# Patient Record
Sex: FEMALE | Race: WHITE | NOT HISPANIC OR LATINO | Employment: OTHER | URBAN - NONMETROPOLITAN AREA
[De-identification: names, ages, dates, MRNs, and addresses within clinical notes are randomized per-mention and may not be internally consistent; named-entity substitution may affect disease eponyms.]

---

## 2018-02-28 RX ORDER — LEVOTHYROXINE SODIUM 0.1 MG/1
100 TABLET ORAL DAILY
COMMUNITY

## 2018-02-28 RX ORDER — PANTOPRAZOLE SODIUM 40 MG/1
40 TABLET, DELAYED RELEASE ORAL 2 TIMES DAILY
COMMUNITY

## 2018-02-28 RX ORDER — CALCIUM CARBONATE 200(500)MG
1 TABLET,CHEWABLE ORAL AS NEEDED
COMMUNITY
End: 2018-04-12 | Stop reason: DRUGHIGH

## 2018-02-28 RX ORDER — SIMVASTATIN 40 MG
40 TABLET ORAL NIGHTLY
COMMUNITY

## 2018-02-28 RX ORDER — SUCRALFATE 1 G/1
1 TABLET ORAL 4 TIMES DAILY
COMMUNITY
End: 2019-12-03

## 2018-02-28 RX ORDER — NAPROXEN 250 MG/1
250 TABLET ORAL AS NEEDED
COMMUNITY

## 2018-02-28 RX ORDER — MELATONIN
1000 DAILY
COMMUNITY

## 2018-04-12 ENCOUNTER — OFFICE VISIT (OUTPATIENT)
Dept: GASTROENTEROLOGY | Facility: CLINIC | Age: 68
End: 2018-04-12

## 2018-04-12 VITALS
DIASTOLIC BLOOD PRESSURE: 82 MMHG | TEMPERATURE: 98.4 F | OXYGEN SATURATION: 98 % | HEIGHT: 63 IN | WEIGHT: 182 LBS | BODY MASS INDEX: 32.25 KG/M2 | HEART RATE: 82 BPM | SYSTOLIC BLOOD PRESSURE: 136 MMHG

## 2018-04-12 DIAGNOSIS — K21.9 GASTROESOPHAGEAL REFLUX DISEASE, ESOPHAGITIS PRESENCE NOT SPECIFIED: Primary | ICD-10-CM

## 2018-04-12 PROCEDURE — 99204 OFFICE O/P NEW MOD 45 MIN: CPT | Performed by: NURSE PRACTITIONER

## 2018-04-12 NOTE — PROGRESS NOTES
[facility] GASTROENTEROLOGY - OFFICE NOTE    4/12/2018    Warren Quinn   1950    Primary Physician: Claudia Keane DO    Chief Complaint   Patient presents with   • Heartburn         HISTORY OF PRESENT ILLNESS    Warren Quinn is a 67 y.o. female presents  with reflux symptoms x  2 yrs. Mostly at night.  Symptoms are heartburn and regurgitation. Certain foods such as mexican can trigger. No caffeine. She is trying to lose weight.  Has been on pantoprazole for 2 years and reflux has never been under control. Recently increased pantoprazole to bid, added carafate and has been doing much better. She has never had egd.     Past Medical History:   Diagnosis Date   • Arthritis    • Breast cancer    • Disease of thyroid gland    • GERD (gastroesophageal reflux disease)    • Hyperlipidemia    • Osteopenia        Past Surgical History:   Procedure Laterality Date   • ANKLE SURGERY     • BREAST AUGMENTATION     • BREAST LUMPECTOMY     • BREAST RECONSTRUCTION     • CARPAL TUNNEL RELEASE     • CATARACT EXTRACTION     • COLONOSCOPY  12/05/2014   • TONSILLECTOMY     • TOTAL HIP ARTHROPLASTY     • TUBAL ABDOMINAL LIGATION         Outpatient Prescriptions Marked as Taking for the 4/12/18 encounter (Office Visit) with JORDAN Nunes   Medication Sig Dispense Refill   • cholecalciferol (VITAMIN D3) 1000 units tablet Take 1,000 Units by mouth Daily.     • levothyroxine (SYNTHROID, LEVOTHROID) 100 MCG tablet Take 100 mcg by mouth Daily.     • naproxen (NAPROSYN) 250 MG tablet Take 250 mg by mouth As Needed.     • pantoprazole (PROTONIX) 40 MG EC tablet Take 40 mg by mouth 2 (Two) Times a Day.     • simvastatin (ZOCOR) 40 MG tablet Take 40 mg by mouth Every Night.     • sucralfate (CARAFATE) 1 g tablet Take 1 g by mouth 4 (Four) Times a Day.         No Known Allergies    Social History     Social History   • Marital status: Single     Spouse name: N/A   • Number of children: N/A   • Years of education: N/A  "    Occupational History   • Not on file.     Social History Main Topics   • Smoking status: Former Smoker   • Smokeless tobacco: Never Used   • Alcohol use Yes      Comment: rare   • Drug use: No   • Sexual activity: Defer     Other Topics Concern   • Not on file     Social History Narrative   • No narrative on file       Family History   Problem Relation Age of Onset   • Colon polyps Father    • Colon cancer Neg Hx        Review of Systems   Constitutional: Negative for chills and fever.   Respiratory: Negative for cough, shortness of breath and wheezing.    Cardiovascular: Negative for chest pain and palpitations.   Gastrointestinal: Negative for abdominal distention, abdominal pain, anal bleeding, blood in stool, constipation, diarrhea, nausea, rectal pain and vomiting.        Vitals:    04/12/18 0944   BP: 136/82   BP Location: Right arm   Patient Position: Sitting   Cuff Size: Adult   Pulse: 82   Temp: 98.4 °F (36.9 °C)   SpO2: 98%   Weight: 82.6 kg (182 lb)   Height: 160 cm (63\")      Body mass index is 32.24 kg/m².    Physical Exam   Constitutional: She is oriented to person, place, and time. She appears well-developed and well-nourished. No distress.   Cardiovascular: Normal rate, regular rhythm and normal heart sounds.    Pulmonary/Chest: Effort normal and breath sounds normal.   Abdominal: Soft. Bowel sounds are normal. She exhibits no distension and no mass. There is no tenderness. There is no rebound and no guarding.   Musculoskeletal: She exhibits no edema.   Neurological: She is alert and oriented to person, place, and time.   Skin: Skin is warm and dry.   Psychiatric: She has a normal mood and affect. Her behavior is normal.   Vitals reviewed.      No results found for this or any previous visit.        ASSESSMENT AND PLAN    Warren was seen today for heartburn.    Diagnoses and all orders for this visit:    Gastroesophageal reflux disease, esophagitis presence not specified  -     Case Request; " Standing  -     Case Request    Other orders  -     Implement Anesthesia Orders Day of Procedure; Standing  -     Obtain Informed Consent; Standing      Recommend  strict antireflux precautions. Continue pantoprazole daily.  rec weight loss.  Plan for egd for longstanding reflux. We discussed changes in the esophagus that can occur such as monzon's esophagus and it's slim chance of developing cancer.     ESOPHAGOGASTRODUODENOSCOPY WITH ANESTHESIA (N/A)   Risk, benefits, and alternatives of endoscopy were explained in full.  They understand that there is a risk of bleeding, perforation, and infection.  The risk of perforation goes up with esophageal dilation.  Other options to evaluate UGI complaints could involve barium swallow or UGI series, but these would be diagnostic tests only.  Patient was given time to ask questions.  I answered them to their satisfaction and they are agreeable to proceeding    Body mass index is 32.24 kg/m².       Patient's Body mass index is 32.24 kg/m². BMI is above normal parameters. Follow-up plan includes:  no follow-up required.          JORDAN Santa Dragon/transcription disclaimer:  Much of this encounter note is electronic transcription/translation of spoken language to printed text.  The electronic translation of spoken language may be erroneous, or at times, nonsensical words or phrases may be inadvertently transcribed.  Although I have reviewed the note for such errors, some may still exist.

## 2018-04-13 PROBLEM — K21.9 GASTROESOPHAGEAL REFLUX DISEASE: Status: ACTIVE | Noted: 2018-04-13

## 2018-05-01 ENCOUNTER — HOSPITAL ENCOUNTER (OUTPATIENT)
Facility: HOSPITAL | Age: 68
Setting detail: HOSPITAL OUTPATIENT SURGERY
Discharge: HOME | End: 2018-05-01
Attending: INTERNAL MEDICINE | Admitting: INTERNAL MEDICINE

## 2018-05-01 ENCOUNTER — ANESTHESIA (OUTPATIENT)
Dept: GASTROENTEROLOGY | Facility: HOSPITAL | Age: 68
End: 2018-05-01

## 2018-05-01 ENCOUNTER — ANESTHESIA EVENT (OUTPATIENT)
Dept: GASTROENTEROLOGY | Facility: HOSPITAL | Age: 68
End: 2018-05-01

## 2018-05-01 VITALS
RESPIRATION RATE: 17 BRPM | WEIGHT: 176 LBS | HEIGHT: 63 IN | DIASTOLIC BLOOD PRESSURE: 71 MMHG | OXYGEN SATURATION: 99 % | BODY MASS INDEX: 31.18 KG/M2 | TEMPERATURE: 97.5 F | SYSTOLIC BLOOD PRESSURE: 129 MMHG | HEART RATE: 68 BPM

## 2018-05-01 DIAGNOSIS — K21.9 GASTROESOPHAGEAL REFLUX DISEASE, ESOPHAGITIS PRESENCE NOT SPECIFIED: ICD-10-CM

## 2018-05-01 PROCEDURE — 43239 EGD BIOPSY SINGLE/MULTIPLE: CPT | Performed by: INTERNAL MEDICINE

## 2018-05-01 PROCEDURE — 25010000002 PROPOFOL 10 MG/ML EMULSION: Performed by: NURSE ANESTHETIST, CERTIFIED REGISTERED

## 2018-05-01 PROCEDURE — 88305 TISSUE EXAM BY PATHOLOGIST: CPT | Performed by: INTERNAL MEDICINE

## 2018-05-01 RX ORDER — LIDOCAINE HYDROCHLORIDE 20 MG/ML
INJECTION, SOLUTION INFILTRATION; PERINEURAL AS NEEDED
Status: DISCONTINUED | OUTPATIENT
Start: 2018-05-01 | End: 2018-05-01 | Stop reason: SURG

## 2018-05-01 RX ORDER — ONDANSETRON 2 MG/ML
4 INJECTION INTRAMUSCULAR; INTRAVENOUS ONCE AS NEEDED
Status: DISCONTINUED | OUTPATIENT
Start: 2018-05-01 | End: 2018-05-01 | Stop reason: HOSPADM

## 2018-05-01 RX ORDER — PROPOFOL 10 MG/ML
VIAL (ML) INTRAVENOUS AS NEEDED
Status: DISCONTINUED | OUTPATIENT
Start: 2018-05-01 | End: 2018-05-01 | Stop reason: SURG

## 2018-05-01 RX ORDER — SODIUM CHLORIDE 0.9 % (FLUSH) 0.9 %
3 SYRINGE (ML) INJECTION AS NEEDED
Status: DISCONTINUED | OUTPATIENT
Start: 2018-05-01 | End: 2018-05-01 | Stop reason: HOSPADM

## 2018-05-01 RX ORDER — SODIUM CHLORIDE 9 MG/ML
500 INJECTION, SOLUTION INTRAVENOUS CONTINUOUS PRN
Status: DISCONTINUED | OUTPATIENT
Start: 2018-05-01 | End: 2018-05-01 | Stop reason: HOSPADM

## 2018-05-01 RX ADMIN — SODIUM CHLORIDE 500 ML: 9 INJECTION, SOLUTION INTRAVENOUS at 07:35

## 2018-05-01 RX ADMIN — PROPOFOL 100 MG: 10 INJECTION, EMULSION INTRAVENOUS at 09:25

## 2018-05-01 RX ADMIN — LIDOCAINE HYDROCHLORIDE 120 MG: 20 INJECTION, SOLUTION INFILTRATION; PERINEURAL at 09:25

## 2018-05-01 NOTE — ANESTHESIA POSTPROCEDURE EVALUATION
"Patient: Warren Quinn    Procedure Summary     Date:  05/01/18 Room / Location:  [facility] ENDOSCOPY 6 /  [facility]    Anesthesia Start:  0922 Anesthesia Stop:  0930    Procedure:  ESOPHAGOGASTRODUODENOSCOPY WITH ANESTHESIA (N/A Esophagus) Diagnosis:       Gastroesophageal reflux disease, esophagitis presence not specified      (Gastroesophageal reflux disease, esophagitis presence not specified [K21.9])    Surgeon:  Elmer Crawley MD Provider:  Vlad Roberts CRNA    Anesthesia Type:  general ASA Status:  2          Anesthesia Type: general  Last vitals  BP   127/79 (05/01/18 0719)   Temp   97.5 °F (36.4 °C) (05/01/18 0719)   Pulse   86 (05/01/18 0719)   Resp   20 (05/01/18 0719)     SpO2   94 % (05/01/18 0719)     Post Anesthesia Care and Evaluation    Patient location during evaluation: PACU  Patient participation: complete - patient participated  Level of consciousness: awake and alert  Pain management: adequate  Airway patency: patent  Anesthetic complications: No anesthetic complications    Cardiovascular status: acceptable  Respiratory status: acceptable  Hydration status: acceptable    Comments: Blood pressure 127/79, pulse 86, temperature 97.5 °F (36.4 °C), temperature source Temporal Artery , resp. rate 20, height 160 cm (63\"), weight 79.8 kg (176 lb), SpO2 94 %.    Pt discharged from PACU based on oksana score >8      "

## 2018-05-01 NOTE — ANESTHESIA PREPROCEDURE EVALUATION
Anesthesia Evaluation     Patient summary reviewed   no history of anesthetic complications:  NPO Solid Status: > 8 hours  NPO Liquid Status: > 8 hours           Airway   Mallampati: II  TM distance: <3 FB  Neck ROM: full  No difficulty expected  Dental - normal exam     Comment: Multiple bridges, caps and crowns    Pulmonary    (-) asthma, sleep apnea, not a smoker  Cardiovascular   Exercise tolerance: good (4-7 METS)    (+) hyperlipidemia,       Neuro/Psych  (-) seizures, TIA, CVA  GI/Hepatic/Renal/Endo    (+)  GERD,  hypothyroidism,   (-) liver disease, no renal disease, diabetes    Musculoskeletal     Abdominal    Substance History      OB/GYN          Other      history of cancer (breast )                  Anesthesia Plan    ASA 2     general   total IV anesthesia  intravenous induction   Anesthetic plan and risks discussed with patient.

## 2018-05-01 NOTE — H&P (VIEW-ONLY)
[facility] GASTROENTEROLOGY - OFFICE NOTE    4/12/2018    Warren Quinn   1950    Primary Physician: Claudia Keane DO    Chief Complaint   Patient presents with   • Heartburn         HISTORY OF PRESENT ILLNESS    Warren Quinn is a 67 y.o. female presents  with reflux symptoms x  2 yrs. Mostly at night.  Symptoms are heartburn and regurgitation. Certain foods such as mexican can trigger. No caffeine. She is trying to lose weight.  Has been on pantoprazole for 2 years and reflux has never been under control. Recently increased pantoprazole to bid, added carafate and has been doing much better. She has never had egd.     Past Medical History:   Diagnosis Date   • Arthritis    • Breast cancer    • Disease of thyroid gland    • GERD (gastroesophageal reflux disease)    • Hyperlipidemia    • Osteopenia        Past Surgical History:   Procedure Laterality Date   • ANKLE SURGERY     • BREAST AUGMENTATION     • BREAST LUMPECTOMY     • BREAST RECONSTRUCTION     • CARPAL TUNNEL RELEASE     • CATARACT EXTRACTION     • COLONOSCOPY  12/05/2014   • TONSILLECTOMY     • TOTAL HIP ARTHROPLASTY     • TUBAL ABDOMINAL LIGATION         Outpatient Prescriptions Marked as Taking for the 4/12/18 encounter (Office Visit) with JORDAN Nunes   Medication Sig Dispense Refill   • cholecalciferol (VITAMIN D3) 1000 units tablet Take 1,000 Units by mouth Daily.     • levothyroxine (SYNTHROID, LEVOTHROID) 100 MCG tablet Take 100 mcg by mouth Daily.     • naproxen (NAPROSYN) 250 MG tablet Take 250 mg by mouth As Needed.     • pantoprazole (PROTONIX) 40 MG EC tablet Take 40 mg by mouth 2 (Two) Times a Day.     • simvastatin (ZOCOR) 40 MG tablet Take 40 mg by mouth Every Night.     • sucralfate (CARAFATE) 1 g tablet Take 1 g by mouth 4 (Four) Times a Day.         No Known Allergies    Social History     Social History   • Marital status: Single     Spouse name: N/A   • Number of children: N/A   • Years of education: N/A  "    Occupational History   • Not on file.     Social History Main Topics   • Smoking status: Former Smoker   • Smokeless tobacco: Never Used   • Alcohol use Yes      Comment: rare   • Drug use: No   • Sexual activity: Defer     Other Topics Concern   • Not on file     Social History Narrative   • No narrative on file       Family History   Problem Relation Age of Onset   • Colon polyps Father    • Colon cancer Neg Hx        Review of Systems   Constitutional: Negative for chills and fever.   Respiratory: Negative for cough, shortness of breath and wheezing.    Cardiovascular: Negative for chest pain and palpitations.   Gastrointestinal: Negative for abdominal distention, abdominal pain, anal bleeding, blood in stool, constipation, diarrhea, nausea, rectal pain and vomiting.        Vitals:    04/12/18 0944   BP: 136/82   BP Location: Right arm   Patient Position: Sitting   Cuff Size: Adult   Pulse: 82   Temp: 98.4 °F (36.9 °C)   SpO2: 98%   Weight: 82.6 kg (182 lb)   Height: 160 cm (63\")      Body mass index is 32.24 kg/m².    Physical Exam   Constitutional: She is oriented to person, place, and time. She appears well-developed and well-nourished. No distress.   Cardiovascular: Normal rate, regular rhythm and normal heart sounds.    Pulmonary/Chest: Effort normal and breath sounds normal.   Abdominal: Soft. Bowel sounds are normal. She exhibits no distension and no mass. There is no tenderness. There is no rebound and no guarding.   Musculoskeletal: She exhibits no edema.   Neurological: She is alert and oriented to person, place, and time.   Skin: Skin is warm and dry.   Psychiatric: She has a normal mood and affect. Her behavior is normal.   Vitals reviewed.      No results found for this or any previous visit.        ASSESSMENT AND PLAN    Warren was seen today for heartburn.    Diagnoses and all orders for this visit:    Gastroesophageal reflux disease, esophagitis presence not specified  -     Case Request; " Standing  -     Case Request    Other orders  -     Implement Anesthesia Orders Day of Procedure; Standing  -     Obtain Informed Consent; Standing      Recommend  strict antireflux precautions. Continue pantoprazole daily.  rec weight loss.  Plan for egd for longstanding reflux. We discussed changes in the esophagus that can occur such as monzon's esophagus and it's slim chance of developing cancer.     ESOPHAGOGASTRODUODENOSCOPY WITH ANESTHESIA (N/A)   Risk, benefits, and alternatives of endoscopy were explained in full.  They understand that there is a risk of bleeding, perforation, and infection.  The risk of perforation goes up with esophageal dilation.  Other options to evaluate UGI complaints could involve barium swallow or UGI series, but these would be diagnostic tests only.  Patient was given time to ask questions.  I answered them to their satisfaction and they are agreeable to proceeding    Body mass index is 32.24 kg/m².       Patient's Body mass index is 32.24 kg/m². BMI is above normal parameters. Follow-up plan includes:  no follow-up required.          JORDAN Santa Dragon/transcription disclaimer:  Much of this encounter note is electronic transcription/translation of spoken language to printed text.  The electronic translation of spoken language may be erroneous, or at times, nonsensical words or phrases may be inadvertently transcribed.  Although I have reviewed the note for such errors, some may still exist.

## 2018-05-02 LAB
CYTO UR: NORMAL
LAB AP CASE REPORT: NORMAL
LAB AP CLINICAL INFORMATION: NORMAL
Lab: NORMAL
PATH REPORT.FINAL DX SPEC: NORMAL
PATH REPORT.GROSS SPEC: NORMAL

## 2019-12-03 ENCOUNTER — OFFICE VISIT (OUTPATIENT)
Dept: GASTROENTEROLOGY | Facility: CLINIC | Age: 69
End: 2019-12-03

## 2019-12-03 VITALS
HEART RATE: 82 BPM | TEMPERATURE: 96.1 F | SYSTOLIC BLOOD PRESSURE: 132 MMHG | OXYGEN SATURATION: 99 % | DIASTOLIC BLOOD PRESSURE: 78 MMHG | BODY MASS INDEX: 31.54 KG/M2 | HEIGHT: 63 IN | WEIGHT: 178 LBS

## 2019-12-03 DIAGNOSIS — Z83.71 FAMILY HX COLONIC POLYPS: Primary | ICD-10-CM

## 2019-12-03 PROBLEM — Z83.719 FAMILY HX COLONIC POLYPS: Status: ACTIVE | Noted: 2019-12-03

## 2019-12-03 PROCEDURE — S0260 H&P FOR SURGERY: HCPCS | Performed by: NURSE PRACTITIONER

## 2019-12-03 NOTE — PROGRESS NOTES
[facility] Gastroenterology    Primary Physician Claudia Keane,     12/3/2019    Warren Quinn   1950      Chief Complaint   Patient presents with   • Colonoscopy       Subjective     HPI    Warren Quinn is a 69 y.o. female who presents as a referral for preventative maintenance. She has no complaints of nausea or vomiting. No change in bowels. No wt loss. No BRBPR. No melena. No abdominal pain.       Last colonoscopy was 12/2014 noting mild sigmoid diverticulosis, recommended recall 5 years.  The patient does not have history of colon polyps. The patient does not have history of colon cancer.  There is  family history of colon polyps father. There is no family history of colon cancer.     Past Medical History:   Diagnosis Date   • Arthritis    • Breast cancer (CMS/HCC)    • Disease of thyroid gland    • GERD (gastroesophageal reflux disease)    • Hyperlipidemia    • Osteopenia        Past Surgical History:   Procedure Laterality Date   • ANKLE SURGERY     • BREAST AUGMENTATION     • BREAST LUMPECTOMY     • BREAST RECONSTRUCTION     • CARPAL TUNNEL RELEASE     • CATARACT EXTRACTION     • COLONOSCOPY  12/05/2014   • ENDOSCOPY N/A 5/1/2018    Procedure: ESOPHAGOGASTRODUODENOSCOPY WITH ANESTHESIA;  Surgeon: Elmer Crawley MD;  Location: [facility] ENDOSCOPY;  Service: Gastroenterology   • TONSILLECTOMY     • TOTAL HIP ARTHROPLASTY     • TUBAL ABDOMINAL LIGATION         Outpatient Medications Marked as Taking for the 12/3/19 encounter (Office Visit) with Susanne Adkins APRN   Medication Sig Dispense Refill   • cholecalciferol (VITAMIN D3) 1000 units tablet Take 1,000 Units by mouth Daily.     • levothyroxine (SYNTHROID, LEVOTHROID) 100 MCG tablet Take 100 mcg by mouth Daily.     • naproxen (NAPROSYN) 250 MG tablet Take 250 mg by mouth As Needed (rare).     • pantoprazole (PROTONIX) 40 MG EC tablet Take 40 mg by mouth 2 (Two) Times a Day.     • simvastatin (ZOCOR) 40 MG tablet Take 40 mg  by mouth Every Night.         No Known Allergies    Social History     Socioeconomic History   • Marital status: Single     Spouse name: Not on file   • Number of children: Not on file   • Years of education: Not on file   • Highest education level: Not on file   Tobacco Use   • Smoking status: Former Smoker   • Smokeless tobacco: Never Used   Substance and Sexual Activity   • Alcohol use: Yes     Comment: rare   • Drug use: No   • Sexual activity: Defer       Family History   Problem Relation Age of Onset   • Colon polyps Father    • Colon cancer Neg Hx        Review of Systems   Constitutional: Negative for appetite change, chills, fatigue, fever and unexpected weight change.   HENT: Negative for sore throat and trouble swallowing.    Eyes: Negative for visual disturbance.   Respiratory: Negative for cough, shortness of breath and wheezing.    Cardiovascular: Negative for chest pain and palpitations.   Gastrointestinal: Negative for abdominal distention, abdominal pain, anal bleeding, blood in stool, constipation, diarrhea, nausea and vomiting.        As mentioned in hpi   Genitourinary: Negative for difficulty urinating and hematuria.   Musculoskeletal: Negative for arthralgias and back pain.   Skin: Negative for color change and rash.   Neurological: Negative for dizziness, seizures, syncope, light-headedness and headaches.   Hematological: Negative for adenopathy.   Psychiatric/Behavioral: Negative for confusion. The patient is not nervous/anxious.        Objective     Vitals:    12/03/19 1022   BP: 132/78   Pulse: 82   Temp: 96.1 °F (35.6 °C)   SpO2: 99%         12/03/19  1022   Weight: 80.7 kg (178 lb)     Body mass index is 31.53 kg/m².    Physical Exam   Constitutional: She appears well-developed and well-nourished. No distress.   HENT:   Head: Normocephalic and atraumatic.   Eyes: EOM are normal. No scleral icterus.   Neck: Neck supple. No JVD present.   Cardiovascular: Normal rate, regular rhythm and  normal heart sounds.   Pulmonary/Chest: Effort normal and breath sounds normal.   Abdominal: Soft. Bowel sounds are normal. She exhibits no distension. There is no tenderness.   Musculoskeletal: Normal range of motion. She exhibits no deformity.   Neurological: She is alert.   Skin: Skin is warm and dry. No rash noted.   Psychiatric: She has a normal mood and affect. Her behavior is normal.   Vitals reviewed.      Imaging Results (Most Recent)     None          Assessment/Plan     Warren was seen today for colonoscopy.    Diagnoses and all orders for this visit:    Family hx colonic polyps  -     Case Request; Standing  -     Case Request    Other orders  -     Follow Anesthesia Guidelines / Standing Orders; Future  -     Implement Anesthesia Orders Day of Procedure; Standing  -     Obtain Informed Consent; Standing  -     Obtain Informed Consent; Future  -     polyethylene glycol (GOLYTELY) 236 g solution; Take 4,000 mL by mouth 1 (One) Time for 1 dose. Take as directed per instruction sheet.    Plan for colonoscopy. Hold naprosyn 5 days prior to colonoscopy.          Body mass index is 31.53 kg/m².    Patient's Body mass index is 31.53 kg/m². BMI is above normal parameters. Recommendations include: no follow up ,recommend weight loss .      COLONOSCOPY WITH ANESTHESIA (N/A)  All risks, benefits, alternatives, and indications of colonoscopy procedure have been discussed with the patient. Risks to include perforation of the colon requiring possible surgery or colostomy, risk of bleeding from biopsies or removal of colon tissue, possibility of missing a colon polyp or cancer, or adverse drug reaction.  Benefits to include the diagnosis and management of disease of the colon and rectum. Alternatives to include barium enema, radiographic evaluation, lab testing or no intervention. Pt verbalizes understanding and agrees.         JORDAN Santa      EMR Dragon/transcription disclaimer:  Much of this encounter  note is electronic transcription/translation of spoken language to printed text.  The electronic translation of spoken language may be erroneous, or at times, nonsensical words or phrases may be inadvertently transcribed.  Although I have reviewed the note for such errors, some may still exist.

## 2020-01-13 ENCOUNTER — TELEPHONE (OUTPATIENT)
Dept: GASTROENTEROLOGY | Facility: CLINIC | Age: 70
End: 2020-01-13

## 2020-01-13 NOTE — TELEPHONE ENCOUNTER
PT is scheduled for a scope on 1-20-20.  PT has a rash, itchy butt which has been going on for a week.  She has been using Prep H.  PT doesn't have a history of hemmorrids.  She has been exercising daily walking, biking, etc.  Can she still have her procedure?

## 2020-01-13 NOTE — TELEPHONE ENCOUNTER
I called her.  She states that she has had some itching at her rectum over the last week.  She started using preparation cream and it is improving.  No significant pain no rectal bleeding.  She is moving her bowels.  She is scheduled for a colonoscopy next Monday, January 20.  Since she is noting improvement of her symptoms I told to continue Preparation H cream and keep appointment for colonoscopy.  She is to call us if has any worsening symptoms.  Denies fevers.

## 2020-01-20 ENCOUNTER — HOSPITAL ENCOUNTER (OUTPATIENT)
Facility: HOSPITAL | Age: 70
Setting detail: HOSPITAL OUTPATIENT SURGERY
Discharge: HOME | End: 2020-01-20
Attending: INTERNAL MEDICINE | Admitting: INTERNAL MEDICINE

## 2020-01-20 ENCOUNTER — ANESTHESIA (OUTPATIENT)
Dept: GASTROENTEROLOGY | Facility: HOSPITAL | Age: 70
End: 2020-01-20

## 2020-01-20 ENCOUNTER — ANESTHESIA EVENT (OUTPATIENT)
Dept: GASTROENTEROLOGY | Facility: HOSPITAL | Age: 70
End: 2020-01-20

## 2020-01-20 VITALS
RESPIRATION RATE: 14 BRPM | BODY MASS INDEX: 31.18 KG/M2 | HEART RATE: 66 BPM | HEIGHT: 63 IN | OXYGEN SATURATION: 93 % | TEMPERATURE: 97.4 F | DIASTOLIC BLOOD PRESSURE: 56 MMHG | WEIGHT: 176 LBS | SYSTOLIC BLOOD PRESSURE: 123 MMHG

## 2020-01-20 DIAGNOSIS — Z83.71 FAMILY HX COLONIC POLYPS: ICD-10-CM

## 2020-01-20 PROCEDURE — 88305 TISSUE EXAM BY PATHOLOGIST: CPT | Performed by: INTERNAL MEDICINE

## 2020-01-20 PROCEDURE — 45385 COLONOSCOPY W/LESION REMOVAL: CPT | Performed by: INTERNAL MEDICINE

## 2020-01-20 PROCEDURE — 25010000002 PROPOFOL 10 MG/ML EMULSION: Performed by: NURSE ANESTHETIST, CERTIFIED REGISTERED

## 2020-01-20 RX ORDER — ONDANSETRON 2 MG/ML
4 INJECTION INTRAMUSCULAR; INTRAVENOUS ONCE AS NEEDED
Status: DISCONTINUED | OUTPATIENT
Start: 2020-01-20 | End: 2020-01-20 | Stop reason: HOSPADM

## 2020-01-20 RX ORDER — LIDOCAINE HYDROCHLORIDE 10 MG/ML
0.5 INJECTION, SOLUTION EPIDURAL; INFILTRATION; INTRACAUDAL; PERINEURAL ONCE AS NEEDED
Status: DISCONTINUED | OUTPATIENT
Start: 2020-01-20 | End: 2020-01-20 | Stop reason: HOSPADM

## 2020-01-20 RX ORDER — SODIUM CHLORIDE 0.9 % (FLUSH) 0.9 %
10 SYRINGE (ML) INJECTION EVERY 12 HOURS SCHEDULED
Status: CANCELLED | OUTPATIENT
Start: 2020-01-20

## 2020-01-20 RX ORDER — SODIUM CHLORIDE 0.9 % (FLUSH) 0.9 %
10 SYRINGE (ML) INJECTION AS NEEDED
Status: DISCONTINUED | OUTPATIENT
Start: 2020-01-20 | End: 2020-01-20 | Stop reason: HOSPADM

## 2020-01-20 RX ORDER — SODIUM CHLORIDE 9 MG/ML
100 INJECTION, SOLUTION INTRAVENOUS CONTINUOUS
Status: CANCELLED | OUTPATIENT
Start: 2020-01-20

## 2020-01-20 RX ORDER — SODIUM CHLORIDE 0.9 % (FLUSH) 0.9 %
10 SYRINGE (ML) INJECTION AS NEEDED
Status: CANCELLED | OUTPATIENT
Start: 2020-01-20

## 2020-01-20 RX ORDER — SODIUM CHLORIDE 9 MG/ML
500 INJECTION, SOLUTION INTRAVENOUS CONTINUOUS PRN
Status: DISCONTINUED | OUTPATIENT
Start: 2020-01-20 | End: 2020-01-20 | Stop reason: HOSPADM

## 2020-01-20 RX ORDER — PROPOFOL 10 MG/ML
VIAL (ML) INTRAVENOUS AS NEEDED
Status: DISCONTINUED | OUTPATIENT
Start: 2020-01-20 | End: 2020-01-20 | Stop reason: SURG

## 2020-01-20 RX ADMIN — PROPOFOL 210 MG: 10 INJECTION, EMULSION INTRAVENOUS at 08:32

## 2020-01-20 RX ADMIN — SODIUM CHLORIDE 500 ML: 9 INJECTION, SOLUTION INTRAVENOUS at 07:30

## 2020-01-20 RX ADMIN — LIDOCAINE HYDROCHLORIDE 50 MG: 20 INJECTION, SOLUTION INTRAVENOUS at 08:32

## 2020-01-20 NOTE — H&P
[facility] Gastroenterology  Pre Procedure History & Physical    Chief Complaint:   Screening    Subjective     HPI:   Screening  fhx of colon polyps    Past Medical History:   Past Medical History:   Diagnosis Date   • Arthritis    • Breast cancer (CMS/HCC)    • Disease of thyroid gland    • GERD (gastroesophageal reflux disease)    • Hyperlipidemia    • Osteopenia        Past Surgical History:  Past Surgical History:   Procedure Laterality Date   • ANKLE SURGERY     • BREAST AUGMENTATION     • BREAST LUMPECTOMY     • BREAST RECONSTRUCTION     • CARPAL TUNNEL RELEASE     • CATARACT EXTRACTION     • COLONOSCOPY  12/05/2014   • ENDOSCOPY N/A 5/1/2018    Procedure: ESOPHAGOGASTRODUODENOSCOPY WITH ANESTHESIA;  Surgeon: Elmer Carwley MD;  Location: [facility] ENDOSCOPY;  Service: Gastroenterology   • HAND SURGERY      3rd finger partial amputation   • TONSILLECTOMY     • TOTAL HIP ARTHROPLASTY     • TUBAL ABDOMINAL LIGATION         Family History:  Family History   Problem Relation Age of Onset   • Colon polyps Father    • Colon cancer Neg Hx        Social History:   reports that she has quit smoking. She has never used smokeless tobacco. She reports that she drinks alcohol. She reports that she does not use drugs.    Medications:   Prior to Admission medications    Medication Sig Start Date End Date Taking? Authorizing Provider   cholecalciferol (VITAMIN D3) 1000 units tablet Take 1,000 Units by mouth Daily.   Yes Marcella Kay MD   levothyroxine (SYNTHROID, LEVOTHROID) 100 MCG tablet Take 100 mcg by mouth Daily.   Yes Marcella Kay MD   naproxen (NAPROSYN) 250 MG tablet Take 250 mg by mouth As Needed (rare).   Yes Marcella Kay MD   pantoprazole (PROTONIX) 40 MG EC tablet Take 40 mg by mouth 2 (Two) Times a Day.   Yes Marcella Kay MD   simvastatin (ZOCOR) 40 MG tablet Take 40 mg by mouth Every Night.    Marcella Kay MD       Allergies:  Patient has no known  "allergies.    ROS:    General: Weight stable  Resp: No SOA  Cardiovascular: No CP    Objective     Blood pressure 114/76, pulse 85, temperature 97.4 °F (36.3 °C), temperature source Temporal, resp. rate 18, height 160 cm (63\"), weight 79.8 kg (176 lb), SpO2 97 %.    Physical Exam   Constitutional: Pt is oriented to person, place, and in no distress.   HENT: Mouth/Throat: Oropharynx is clear.   Cardiovascular: Normal rate, regular rhythm.    Pulmonary/Chest: Effort normal. No respiratory distress. No  wheezes.   Abdominal: Soft. Non-distended.  Skin: Skin is warm and dry.   Psychiatric: Mood, memory, affect and judgment appear normal.     Assessment/Plan     Diagnosis:  Screening    Anticipated Surgical Procedure:  Colonoscopy    The risks, benefits, and alternatives of this procedure have been discussed with the patient or the responsible party- the patient understands and agrees to proceed.    EMR Dragon/transcription disclaimer:  Much of this encounter note is electronic transcription/translation of spoken language to printed text.  The electronic translation of spoken language may be erroneous, or at times, nonsensical words or phrases may be inadvertently transcribed.  Although I have reviewed the note for such errors, some may still exist.  "

## 2020-01-20 NOTE — ANESTHESIA POSTPROCEDURE EVALUATION
"Patient: Warren Quinn    Procedure Summary     Date:  01/20/20 Room / Location:  [facility] ENDOSCOPY 4 / [facility] ENDOSCOPY    Anesthesia Start:  0828 Anesthesia Stop:  0901    Procedure:  COLONOSCOPY WITH ANESTHESIA (N/A ) Diagnosis:       Family hx colonic polyps      (Family hx colonic polyps [Z83.71])    Surgeon:  Elmer Crawley MD Provider:  Vlad Roberts CRNA    Anesthesia Type:  MAC ASA Status:  2          Anesthesia Type: MAC    Vitals  Vitals Value Taken Time   /51 1/20/2020  9:10 AM   Temp     Pulse 70 1/20/2020  9:12 AM   Resp 13 1/20/2020  8:58 AM   SpO2 96 % 1/20/2020  9:12 AM   Vitals shown include unvalidated device data.        Post Anesthesia Care and Evaluation    Patient location during evaluation: PACU  Patient participation: complete - patient participated  Level of consciousness: awake and alert  Pain management: adequate  Airway patency: patent  Anesthetic complications: No anesthetic complications    Cardiovascular status: acceptable  Respiratory status: acceptable  Hydration status: acceptable    Comments: Blood pressure 105/48, pulse 74, temperature 97.4 °F (36.3 °C), temperature source Temporal, resp. rate 13, height 160 cm (63\"), weight 79.8 kg (176 lb), SpO2 95 %.    Pt discharged from PACU based on oksana score >8      "

## 2020-01-20 NOTE — ANESTHESIA PREPROCEDURE EVALUATION
Anesthesia Evaluation     Patient summary reviewed   no history of anesthetic complications:  NPO Solid Status: > 8 hours  NPO Liquid Status: > 8 hours           Airway   Mallampati: II  TM distance: <3 FB  Neck ROM: full  No difficulty expected  Dental - normal exam     Comment: Multiple bridges, caps and crowns    Pulmonary    (-) asthma, sleep apnea, not a smoker  Cardiovascular   Exercise tolerance: good (4-7 METS)    (+) hyperlipidemia,       Neuro/Psych  (-) seizures, TIA, CVA  GI/Hepatic/Renal/Endo    (+)  GERD,    (-) liver disease, no renal disease, diabetes    Musculoskeletal     Abdominal    Substance History      OB/GYN          Other      history of cancer (breast )                      Anesthesia Plan    ASA 2     MAC   total IV anesthesia  intravenous induction     Anesthetic plan, all risks, benefits, and alternatives have been provided, discussed and informed consent has been obtained with: patient.

## 2020-01-21 LAB
CYTO UR: NORMAL
LAB AP CASE REPORT: NORMAL
PATH REPORT.FINAL DX SPEC: NORMAL
PATH REPORT.GROSS SPEC: NORMAL

## 2022-12-09 ENCOUNTER — TRANSCRIBE ORDERS (OUTPATIENT)
Dept: ADMINISTRATIVE | Facility: HOSPITAL | Age: 72
End: 2022-12-09

## 2022-12-09 DIAGNOSIS — M54.9 DORSALGIA: Primary | ICD-10-CM

## 2022-12-09 DIAGNOSIS — R19.8 SYMPTOMS INVOLVING ABDOMEN AND PELVIS: ICD-10-CM

## 2022-12-09 DIAGNOSIS — R07.89 OTHER CHEST PAIN: ICD-10-CM

## 2022-12-13 ENCOUNTER — HOSPITAL ENCOUNTER (OUTPATIENT)
Dept: CT IMAGING | Facility: HOSPITAL | Age: 72
Discharge: HOME | End: 2022-12-13

## 2022-12-13 ENCOUNTER — HOSPITAL ENCOUNTER (OUTPATIENT)
Dept: GENERAL RADIOLOGY | Facility: HOSPITAL | Age: 72
Discharge: HOME | End: 2022-12-13

## 2022-12-13 ENCOUNTER — HOSPITAL ENCOUNTER (OUTPATIENT)
Dept: GENERAL RADIOLOGY | Facility: HOSPITAL | Age: 72
End: 2022-12-13

## 2022-12-13 DIAGNOSIS — M54.9 DORSALGIA: ICD-10-CM

## 2022-12-13 DIAGNOSIS — R07.89 OTHER CHEST PAIN: ICD-10-CM

## 2022-12-13 DIAGNOSIS — R19.8 SYMPTOMS INVOLVING ABDOMEN AND PELVIS: ICD-10-CM

## 2022-12-13 LAB — CREAT BLDA-MCNC: 1 MG/DL (ref 0.6–1.3)

## 2022-12-13 PROCEDURE — 74174 CTA ABD&PLVS W/CONTRAST: CPT

## 2022-12-13 PROCEDURE — 82565 ASSAY OF CREATININE: CPT

## 2022-12-13 PROCEDURE — 71101 X-RAY EXAM UNILAT RIBS/CHEST: CPT

## 2022-12-13 PROCEDURE — 72070 X-RAY EXAM THORAC SPINE 2VWS: CPT

## 2022-12-13 PROCEDURE — 25010000002 IOPAMIDOL 61 % SOLUTION: Performed by: FAMILY MEDICINE

## 2022-12-13 RX ADMIN — IOPAMIDOL 150 ML: 612 INJECTION, SOLUTION INTRAVENOUS at 12:35

## 2024-05-20 ENCOUNTER — HOSPITAL ENCOUNTER (OUTPATIENT)
Dept: ULTRASOUND IMAGING | Facility: HOSPITAL | Age: 74
Discharge: HOME | End: 2024-05-20
Admitting: PHYSICIAN ASSISTANT
Payer: MEDICARE

## 2024-05-20 ENCOUNTER — TRANSCRIBE ORDERS (OUTPATIENT)
Dept: ADMINISTRATIVE | Facility: HOSPITAL | Age: 74
End: 2024-05-20
Payer: MEDICARE

## 2024-05-20 DIAGNOSIS — R22.9 LOCALIZED SWELLING, MASS AND LUMP, UNSPECIFIED: ICD-10-CM

## 2024-05-20 DIAGNOSIS — R22.9 LOCALIZED SWELLING, MASS AND LUMP, UNSPECIFIED: Primary | ICD-10-CM

## 2024-05-20 PROCEDURE — 76536 US EXAM OF HEAD AND NECK: CPT

## 2024-05-21 ENCOUNTER — TRANSCRIBE ORDERS (OUTPATIENT)
Dept: ADMINISTRATIVE | Facility: HOSPITAL | Age: 74
End: 2024-05-21

## 2024-05-21 DIAGNOSIS — K11.8 OTHER DISEASES OF SALIVARY GLANDS: Primary | ICD-10-CM

## 2024-05-22 ENCOUNTER — TRANSCRIBE ORDERS (OUTPATIENT)
Dept: ADMINISTRATIVE | Facility: HOSPITAL | Age: 74
End: 2024-05-22
Payer: MEDICARE

## 2024-05-22 DIAGNOSIS — K11.8 OTHER DISEASES OF SALIVARY GLANDS: Primary | ICD-10-CM

## 2024-05-29 ENCOUNTER — HOSPITAL ENCOUNTER (OUTPATIENT)
Dept: CT IMAGING | Facility: HOSPITAL | Age: 74
Discharge: HOME | End: 2024-05-29
Admitting: FAMILY MEDICINE
Payer: MEDICARE

## 2024-05-29 DIAGNOSIS — K11.8 OTHER DISEASES OF SALIVARY GLANDS: ICD-10-CM

## 2024-05-29 LAB — CREAT BLDA-MCNC: 0.9 MG/DL (ref 0.6–1.3)

## 2024-05-29 PROCEDURE — 82565 ASSAY OF CREATININE: CPT

## 2024-05-29 PROCEDURE — 25510000001 IOPAMIDOL 61 % SOLUTION: Performed by: FAMILY MEDICINE

## 2024-05-29 PROCEDURE — 70491 CT SOFT TISSUE NECK W/DYE: CPT

## 2024-05-29 RX ADMIN — IOPAMIDOL 100 ML: 612 INJECTION, SOLUTION INTRAVENOUS at 13:51

## 2024-07-10 ENCOUNTER — OFFICE VISIT (OUTPATIENT)
Dept: ENT CLINIC | Age: 74
End: 2024-07-10
Payer: MEDICARE

## 2024-07-10 VITALS
DIASTOLIC BLOOD PRESSURE: 82 MMHG | HEIGHT: 63 IN | BODY MASS INDEX: 30.48 KG/M2 | WEIGHT: 172 LBS | SYSTOLIC BLOOD PRESSURE: 120 MMHG

## 2024-07-10 DIAGNOSIS — K11.20 PAROTITIS: Primary | ICD-10-CM

## 2024-07-10 PROCEDURE — 1123F ACP DISCUSS/DSCN MKR DOCD: CPT | Performed by: NURSE PRACTITIONER

## 2024-07-10 PROCEDURE — 99203 OFFICE O/P NEW LOW 30 MIN: CPT | Performed by: NURSE PRACTITIONER

## 2024-07-10 RX ORDER — LEVOTHYROXINE SODIUM 0.1 MG/1
100 TABLET ORAL DAILY
COMMUNITY

## 2024-07-10 RX ORDER — SIMVASTATIN 40 MG
TABLET ORAL
COMMUNITY

## 2024-07-10 RX ORDER — AMOXICILLIN AND CLAVULANATE POTASSIUM 875; 125 MG/1; MG/1
1 TABLET, FILM COATED ORAL 2 TIMES DAILY
Qty: 20 TABLET | Refills: 0 | Status: SHIPPED | OUTPATIENT
Start: 2024-07-10 | End: 2024-07-20

## 2024-07-10 RX ORDER — MELATONIN
1000 DAILY
COMMUNITY

## 2024-07-10 RX ORDER — PANTOPRAZOLE SODIUM 40 MG/1
TABLET, DELAYED RELEASE ORAL
COMMUNITY

## 2024-07-10 ASSESSMENT — ENCOUNTER SYMPTOMS
EYES NEGATIVE: 1
GASTROINTESTINAL NEGATIVE: 1
ALLERGIC/IMMUNOLOGIC NEGATIVE: 1
FACIAL SWELLING: 1
RESPIRATORY NEGATIVE: 1

## 2024-07-10 NOTE — PROGRESS NOTES
7/10/2024    Nishant Alonzo (:  1950) is a 73 y.o. female, Established patient, here for evaluation of the following chief complaint(s):  New Patient (PAROTID GLAND )      Vitals:    07/10/24 1027   BP: 120/82   Weight: 78 kg (172 lb)   Height: 1.6 m (5' 3\")       Wt Readings from Last 3 Encounters:   07/10/24 78 kg (172 lb)       BP Readings from Last 3 Encounters:   07/10/24 120/82         SUBJECTIVE/OBJECTIVE:    Patient seen today for her right parotid gland. She states that about a month ago she ate dinner and was fine, then later she went to eat a banana and couldn't open her mouth. She states that it was not painful. She used warm compresses and sucked on lemon heads and did notice improvement with this. Her PCP ordered an ultrasound that showed \"Small hypoechoic nodules within the RIGHT parotid gland have an appearance which is commonly seen with Warthin tumors which are the most common solid parotid masses.\" This was followed by a CT scan that showed \"No soft tissue masses identified in the neck. Specifically, there is no evidence of mass arising from the right parotid gland.\" She denies pain with chewing. She does still feel the right parotid area is slightly swollen.         Review of Systems   Constitutional: Negative.    HENT:  Positive for facial swelling (right side).    Eyes: Negative.    Respiratory: Negative.     Cardiovascular: Negative.    Gastrointestinal: Negative.    Endocrine: Negative.    Musculoskeletal: Negative.    Skin: Negative.    Allergic/Immunologic: Negative.    Neurological: Negative.    Hematological: Negative.    Psychiatric/Behavioral: Negative.          Physical Exam  Vitals reviewed.   Constitutional:       Appearance: Normal appearance. She is normal weight.   HENT:      Head: Normocephalic and atraumatic.      Right Ear: Tympanic membrane, ear canal and external ear normal.      Left Ear: Tympanic membrane, ear canal and external ear normal.      Nose: Nose

## 2024-07-10 NOTE — PROGRESS NOTES
7/10/2024    Nishant Alonzo (:  1950) is a 73 y.o. female, Established patient, here for evaluation of the following chief complaint(s):  New Patient (PAROTID GLAND )      Vitals:    07/10/24 1027   BP: 120/82   Weight: 78 kg (172 lb)   Height: 1.6 m (5' 3\")       Wt Readings from Last 3 Encounters:   07/10/24 78 kg (172 lb)       BP Readings from Last 3 Encounters:   07/10/24 120/82         SUBJECTIVE/OBJECTIVE:    Patient seen today for her right parotid gland. She states that about a month ago she ate dinner and was fine, then later she went to eat a banana and couldn't open her mouth. She states that it was not painful. She used warm compresses and sucked on lemon heads and did notice improvement with this. Her PCP ordered an ultrasound that showed \"Small hypoechoic nodules within the RIGHT parotid gland have an appearance which is commonly seen with Warthin tumors which are the most common solid parotid masses.\" This was followed by a CT scan that showed \"No soft tissue masses identified in the neck. Specifically, there is no evidence of mass arising from the right parotid gland.\" She denies pain with chewing. She does still feel the right parotid area is slightly swollen.             Review of Systems   Constitutional: Negative.    HENT:  Positive for facial swelling.    Eyes: Negative.    Respiratory: Negative.     Cardiovascular: Negative.    Gastrointestinal: Negative.    Endocrine: Negative.    Musculoskeletal: Negative.    Skin: Negative.    Allergic/Immunologic: Negative.    Neurological: Negative.    Hematological: Negative.    Psychiatric/Behavioral: Negative.          Physical Exam  Vitals reviewed.   Constitutional:       Appearance: Normal appearance. She is normal weight.   HENT:      Head: Normocephalic and atraumatic.      Right Ear: Tympanic membrane, ear canal and external ear normal.      Left Ear: Tympanic membrane, ear canal and external ear normal.      Nose: Nose normal.

## 2024-08-05 ENCOUNTER — OFFICE VISIT (OUTPATIENT)
Dept: ENT CLINIC | Age: 74
End: 2024-08-05
Payer: MEDICARE

## 2024-08-05 VITALS
BODY MASS INDEX: 31.18 KG/M2 | WEIGHT: 176 LBS | DIASTOLIC BLOOD PRESSURE: 80 MMHG | OXYGEN SATURATION: 97 % | HEART RATE: 81 BPM | HEIGHT: 63 IN | SYSTOLIC BLOOD PRESSURE: 139 MMHG

## 2024-08-05 DIAGNOSIS — K11.20 PAROTITIS: Primary | ICD-10-CM

## 2024-08-05 PROCEDURE — 1123F ACP DISCUSS/DSCN MKR DOCD: CPT | Performed by: NURSE PRACTITIONER

## 2024-08-05 PROCEDURE — 99213 OFFICE O/P EST LOW 20 MIN: CPT | Performed by: NURSE PRACTITIONER

## 2024-08-05 RX ORDER — SULFAMETHOXAZOLE AND TRIMETHOPRIM 800; 160 MG/1; MG/1
1 TABLET ORAL 2 TIMES DAILY
Qty: 20 TABLET | Refills: 0 | Status: SHIPPED | OUTPATIENT
Start: 2024-08-05 | End: 2024-08-15

## 2024-08-05 ASSESSMENT — ENCOUNTER SYMPTOMS
EYES NEGATIVE: 1
GASTROINTESTINAL NEGATIVE: 1
RESPIRATORY NEGATIVE: 1
ALLERGIC/IMMUNOLOGIC NEGATIVE: 1

## 2024-08-05 NOTE — PROGRESS NOTES
2024    Nishant Alonzo (:  1950) is a 73 y.o. female, Established patient, here for evaluation of the following chief complaint(s):  Follow-up      Vitals:    24 1008   BP: 139/80   Site: Left Upper Arm   Pulse: 81   SpO2: 97%   Weight: 79.8 kg (176 lb)   Height: 1.6 m (5' 3\")       Wt Readings from Last 3 Encounters:   24 79.8 kg (176 lb)   07/10/24 78 kg (172 lb)       BP Readings from Last 3 Encounters:   24 139/80   07/10/24 120/82         SUBJECTIVE/OBJECTIVE:    Patient seen today for follow up of parotid gland. She was given Augmentin at her last visit. She states that she can still feel the spot. She states that it will occasionally be painful but does not always hurt. She denies pain with chewing or difficulty swallowing.        Review of Systems   Constitutional: Negative.    HENT:          Pain in right parotid region; intermittent   Eyes: Negative.    Respiratory: Negative.     Cardiovascular: Negative.    Gastrointestinal: Negative.    Endocrine: Negative.    Musculoskeletal: Negative.    Skin: Negative.    Allergic/Immunologic: Negative.    Neurological: Negative.    Hematological: Negative.    Psychiatric/Behavioral: Negative.          Physical Exam  Vitals reviewed.   Constitutional:       Appearance: Normal appearance. She is normal weight.   HENT:      Head: Normocephalic and atraumatic.      Salivary Glands: Right salivary gland is diffusely enlarged.        Right Ear: Tympanic membrane, ear canal and external ear normal.      Left Ear: Tympanic membrane, ear canal and external ear normal.      Nose: Nose normal.      Mouth/Throat:      Mouth: Mucous membranes are moist.      Pharynx: Oropharynx is clear.   Eyes:      Extraocular Movements: Extraocular movements intact.      Pupils: Pupils are equal, round, and reactive to light.   Cardiovascular:      Rate and Rhythm: Normal rate and regular rhythm.   Pulmonary:      Effort: Pulmonary effort is normal.

## 2024-09-03 ENCOUNTER — OFFICE VISIT (OUTPATIENT)
Dept: ENT CLINIC | Age: 74
End: 2024-09-03
Payer: MEDICARE

## 2024-09-03 VITALS
DIASTOLIC BLOOD PRESSURE: 78 MMHG | BODY MASS INDEX: 30.83 KG/M2 | HEIGHT: 63 IN | WEIGHT: 174 LBS | SYSTOLIC BLOOD PRESSURE: 120 MMHG

## 2024-09-03 DIAGNOSIS — H69.91 DYSFUNCTION OF RIGHT EUSTACHIAN TUBE: Primary | ICD-10-CM

## 2024-09-03 DIAGNOSIS — Z91.09 ENVIRONMENTAL ALLERGIES: ICD-10-CM

## 2024-09-03 PROCEDURE — 99213 OFFICE O/P EST LOW 20 MIN: CPT | Performed by: NURSE PRACTITIONER

## 2024-09-03 PROCEDURE — 1123F ACP DISCUSS/DSCN MKR DOCD: CPT | Performed by: NURSE PRACTITIONER

## 2024-09-03 RX ORDER — PSEUDOEPHEDRINE HCL 120 MG/1
120 TABLET, FILM COATED, EXTENDED RELEASE ORAL EVERY 12 HOURS
Qty: 14 TABLET | Refills: 0 | Status: SHIPPED | OUTPATIENT
Start: 2024-09-03 | End: 2024-09-10

## 2024-09-03 RX ORDER — LEVOCETIRIZINE DIHYDROCHLORIDE 5 MG/1
5 TABLET, FILM COATED ORAL NIGHTLY
Qty: 30 TABLET | Refills: 5 | Status: SHIPPED | OUTPATIENT
Start: 2024-09-03

## 2024-09-03 ASSESSMENT — ENCOUNTER SYMPTOMS
GASTROINTESTINAL NEGATIVE: 1
RESPIRATORY NEGATIVE: 1
EYE DISCHARGE: 1

## 2024-09-03 NOTE — PROGRESS NOTES
9/3/2024    Nishant Alonzo (:  1950) is a 73 y.o. female, Established patient, here for evaluation of the following chief complaint(s):  Follow-up (Parotitis)      Vitals:    24 1006   BP: 120/78   Weight: 78.9 kg (174 lb)   Height: 1.6 m (5' 3\")       Wt Readings from Last 3 Encounters:   24 78.9 kg (174 lb)   24 79.8 kg (176 lb)   07/10/24 78 kg (172 lb)       BP Readings from Last 3 Encounters:   24 120/78   24 139/80   07/10/24 120/82         SUBJECTIVE/OBJECTIVE:    Patient seen today for follow up of right parotid gland. She was given antibiotics at her last visit. She states that this is doing much better. She denies pain or difficulty chewing. She does state that her right ear feels stopped up and her hearing is muffled on that side. She denies ear pain. She states this started a few weeks ago. She states she has allergies and does not take anything for this. She denies sinus pressure, congestion, rhinorrhea, or post nasal drip. She does report that her eyes have been watering frequently. She states she mowed yesterday and this made it worse.        Review of Systems   Constitutional: Negative.    HENT:  Positive for ear pain (fullness, right) and hearing loss (right, muffled).    Eyes:  Positive for discharge (watering).   Respiratory: Negative.     Cardiovascular: Negative.    Gastrointestinal: Negative.    Endocrine: Negative.    Musculoskeletal: Negative.    Skin: Negative.    Allergic/Immunologic: Positive for environmental allergies.   Neurological: Negative.    Hematological: Negative.    Psychiatric/Behavioral: Negative.          Physical Exam  Vitals reviewed.   Constitutional:       Appearance: Normal appearance. She is normal weight.   HENT:      Head: Normocephalic and atraumatic.      Right Ear: Tympanic membrane, ear canal and external ear normal.      Left Ear: Tympanic membrane, ear canal and external ear normal.      Nose: Nose normal.       weight-bearing as tolerated/as per podiatry

## 2024-10-01 ENCOUNTER — OFFICE VISIT (OUTPATIENT)
Dept: ENT CLINIC | Age: 74
End: 2024-10-01
Payer: MEDICARE

## 2024-10-01 VITALS
WEIGHT: 172 LBS | SYSTOLIC BLOOD PRESSURE: 122 MMHG | HEIGHT: 63 IN | BODY MASS INDEX: 30.48 KG/M2 | DIASTOLIC BLOOD PRESSURE: 84 MMHG

## 2024-10-01 DIAGNOSIS — Z91.09 ENVIRONMENTAL ALLERGIES: ICD-10-CM

## 2024-10-01 DIAGNOSIS — H04.203 BILATERAL EPIPHORA: ICD-10-CM

## 2024-10-01 DIAGNOSIS — K11.20 PAROTITIS: ICD-10-CM

## 2024-10-01 DIAGNOSIS — H69.91 DYSFUNCTION OF RIGHT EUSTACHIAN TUBE: Primary | ICD-10-CM

## 2024-10-01 PROCEDURE — 99213 OFFICE O/P EST LOW 20 MIN: CPT | Performed by: NURSE PRACTITIONER

## 2024-10-01 PROCEDURE — 1123F ACP DISCUSS/DSCN MKR DOCD: CPT | Performed by: NURSE PRACTITIONER

## 2024-10-01 ASSESSMENT — ENCOUNTER SYMPTOMS
FACIAL SWELLING: 1
EYE DISCHARGE: 1
RESPIRATORY NEGATIVE: 1
GASTROINTESTINAL NEGATIVE: 1
ALLERGIC/IMMUNOLOGIC NEGATIVE: 1

## 2024-10-01 NOTE — PROGRESS NOTES
Nose: Nose normal.      Mouth/Throat:      Mouth: Mucous membranes are moist.      Pharynx: Oropharynx is clear.   Eyes:      Extraocular Movements: Extraocular movements intact.      Pupils: Pupils are equal, round, and reactive to light.   Pulmonary:      Effort: Pulmonary effort is normal.   Musculoskeletal:      Cervical back: Normal range of motion.   Skin:     General: Skin is warm and dry.   Neurological:      General: No focal deficit present.      Mental Status: She is alert and oriented to person, place, and time.   Psychiatric:         Mood and Affect: Mood normal.         Behavior: Behavior normal.            ASSESSMENT/PLAN:    1. Dysfunction of right eustachian tube  2. Environmental allergies  3. Parotitis  4. Bilateral epiphora    Instructed patient to keep appointment with ophthalmologist and let them know about her eye watering. She voiced understanding. Discussed with patient the potential benefit of adding another allergy medication to help with her right ear. She voiced understanding and deferred. Discussed with patient the potential benefit of a hearing test for further evaluation of intermittent fullness. She voiced understanding and deferred. Instructed patient to continue using a warm compresses, drink water, and suck on sialogogues for right parotid gland. She voiced understanding. Follow-up in about 2 months, sooner if needed.    Return in about 2 months (around 12/1/2024).    An electronic signature was used to authenticate this note.    MOODY Spaulding - CNP       Please note that this chart was generated using dragon dictation software.  Although every effort was made to ensure the accuracy of this automated transcription, some errors in transcription may have occurred.

## 2024-10-14 DIAGNOSIS — M25.511 RIGHT SHOULDER PAIN, UNSPECIFIED CHRONICITY: Primary | ICD-10-CM

## 2024-10-15 ENCOUNTER — OFFICE VISIT (OUTPATIENT)
Age: 74
End: 2024-10-15
Payer: MEDICARE

## 2024-10-15 VITALS — WEIGHT: 172 LBS | HEIGHT: 63 IN | BODY MASS INDEX: 30.48 KG/M2

## 2024-10-15 DIAGNOSIS — M25.512 ACUTE PAIN OF LEFT SHOULDER: Primary | ICD-10-CM

## 2024-10-15 DIAGNOSIS — M75.41 ROTATOR CUFF IMPINGEMENT SYNDROME OF RIGHT SHOULDER: ICD-10-CM

## 2024-10-15 PROCEDURE — G8400 PT W/DXA NO RESULTS DOC: HCPCS | Performed by: PHYSICIAN ASSISTANT

## 2024-10-15 PROCEDURE — G8427 DOCREV CUR MEDS BY ELIG CLIN: HCPCS | Performed by: PHYSICIAN ASSISTANT

## 2024-10-15 PROCEDURE — G8484 FLU IMMUNIZE NO ADMIN: HCPCS | Performed by: PHYSICIAN ASSISTANT

## 2024-10-15 PROCEDURE — 20610 DRAIN/INJ JOINT/BURSA W/O US: CPT | Performed by: PHYSICIAN ASSISTANT

## 2024-10-15 PROCEDURE — 99214 OFFICE O/P EST MOD 30 MIN: CPT | Performed by: PHYSICIAN ASSISTANT

## 2024-10-15 PROCEDURE — G8417 CALC BMI ABV UP PARAM F/U: HCPCS | Performed by: PHYSICIAN ASSISTANT

## 2024-10-15 PROCEDURE — 3017F COLORECTAL CA SCREEN DOC REV: CPT | Performed by: PHYSICIAN ASSISTANT

## 2024-10-15 PROCEDURE — 1036F TOBACCO NON-USER: CPT | Performed by: PHYSICIAN ASSISTANT

## 2024-10-15 PROCEDURE — 1123F ACP DISCUSS/DSCN MKR DOCD: CPT | Performed by: PHYSICIAN ASSISTANT

## 2024-10-15 PROCEDURE — 1090F PRES/ABSN URINE INCON ASSESS: CPT | Performed by: PHYSICIAN ASSISTANT

## 2024-10-15 RX ORDER — BETAMETHASONE SODIUM PHOSPHATE AND BETAMETHASONE ACETATE 3; 3 MG/ML; MG/ML
6 INJECTION, SUSPENSION INTRA-ARTICULAR; INTRALESIONAL; INTRAMUSCULAR; SOFT TISSUE ONCE
Status: COMPLETED | OUTPATIENT
Start: 2024-10-15 | End: 2024-10-15

## 2024-10-15 RX ORDER — METHYLPREDNISOLONE 4 MG
TABLET, DOSE PACK ORAL
Qty: 1 KIT | Refills: 0 | Status: SHIPPED | OUTPATIENT
Start: 2024-10-15 | End: 2024-10-21

## 2024-10-15 RX ORDER — BUPIVACAINE HYDROCHLORIDE 5 MG/ML
3 INJECTION, SOLUTION PERINEURAL ONCE
Status: COMPLETED | OUTPATIENT
Start: 2024-10-15 | End: 2024-10-15

## 2024-10-15 RX ADMIN — BETAMETHASONE SODIUM PHOSPHATE AND BETAMETHASONE ACETATE 6 MG: 3; 3 INJECTION, SUSPENSION INTRA-ARTICULAR; INTRALESIONAL; INTRAMUSCULAR; SOFT TISSUE at 11:15

## 2024-10-15 RX ADMIN — BUPIVACAINE HYDROCHLORIDE 15 MG: 5 INJECTION, SOLUTION PERINEURAL at 11:15

## 2024-10-15 NOTE — PROGRESS NOTES
Orthopaedic Clinic Note - Established Patient    NAME:  Nishant Alonzo   : 1950  MRN: [id number]      10/15/2024      CHIEF COMPLAINT: Right shoulder pain      HISTORY OF PRESENT ILLNESS:   This is a pleasant 73-year-old white female comes in with plaints of right shoulder pain.  Patient denies any injury or trauma.  Patient reports having a history of what sounds like previous adhesive capsulitis of both shoulders.  She is reporting pain in her right shoulder that is diffuse sometimes rating down her lateral aspect of her right arm.  Sharp, piercing, rather constant but worse with certain movements.  It is moderate to severe in intensity.  Patient's done therapy in the past.  She has had no recent treatments.    Past Medical History:        Diagnosis Date    Bursitis     Cancer (HCC)     Carpal tunnel syndrome     GERD (gastroesophageal reflux disease)     High cholesterol     Thyroid disease        Past Surgical History:        Procedure Laterality Date    ANKLE FRACTURE SURGERY Left     BREAST LUMPECTOMY Left     BREAST RECONSTRUCTION      CARPAL TUNNEL RELEASE      HIP SURGERY      LYMPH NODE DISSECTION      TONSILLECTOMY         Current Medications:   Prior to Admission medications    Medication Sig Start Date End Date Taking? Authorizing Provider   levocetirizine (XYZAL) 5 MG tablet Take 1 tablet by mouth nightly 9/3/24  Yes Kacie Huff, MOODY - CNP   simvastatin (ZOCOR) 40 MG tablet 1 tablet in the evening Orally Once a day for 30 day(s)   Yes James Morejon MD   levothyroxine (SYNTHROID) 100 MCG tablet Take 1 tablet by mouth daily   Yes James Morejon MD   pantoprazole (PROTONIX) 40 MG tablet BID Orally Once a day for 30 day(s)   Yes James Morejon MD   vitamin D3 (CHOLECALCIFEROL) 25 MCG (1000 UT) TABS tablet Take 1 tablet by mouth daily   Yes James Morejon MD       Allergies: Patient has no known allergies.    System Neg/Pos Details   Constitutional negative   Fatigue

## 2024-12-02 ENCOUNTER — OFFICE VISIT (OUTPATIENT)
Age: 74
End: 2024-12-02
Payer: MEDICARE

## 2024-12-02 VITALS — HEIGHT: 63 IN | BODY MASS INDEX: 30.83 KG/M2 | WEIGHT: 174 LBS

## 2024-12-02 DIAGNOSIS — M75.01 ADHESIVE CAPSULITIS OF RIGHT SHOULDER: Primary | ICD-10-CM

## 2024-12-02 PROCEDURE — 1036F TOBACCO NON-USER: CPT | Performed by: PHYSICIAN ASSISTANT

## 2024-12-02 PROCEDURE — G8417 CALC BMI ABV UP PARAM F/U: HCPCS | Performed by: PHYSICIAN ASSISTANT

## 2024-12-02 PROCEDURE — G8400 PT W/DXA NO RESULTS DOC: HCPCS | Performed by: PHYSICIAN ASSISTANT

## 2024-12-02 PROCEDURE — 1159F MED LIST DOCD IN RCRD: CPT | Performed by: PHYSICIAN ASSISTANT

## 2024-12-02 PROCEDURE — 99213 OFFICE O/P EST LOW 20 MIN: CPT | Performed by: PHYSICIAN ASSISTANT

## 2024-12-02 PROCEDURE — 1090F PRES/ABSN URINE INCON ASSESS: CPT | Performed by: PHYSICIAN ASSISTANT

## 2024-12-02 PROCEDURE — G8427 DOCREV CUR MEDS BY ELIG CLIN: HCPCS | Performed by: PHYSICIAN ASSISTANT

## 2024-12-02 PROCEDURE — G8484 FLU IMMUNIZE NO ADMIN: HCPCS | Performed by: PHYSICIAN ASSISTANT

## 2024-12-02 PROCEDURE — 1123F ACP DISCUSS/DSCN MKR DOCD: CPT | Performed by: PHYSICIAN ASSISTANT

## 2024-12-02 NOTE — PROGRESS NOTES
Orthopaedic Clinic Note - Established Patient    NAME:  Nishant Alonzo   : 1950  MRN: [id number]      2024      CHIEF COMPLAINT: Follow-up right shoulder pain/adhesive capsulitis      HISTORY OF PRESENT ILLNESS:   This is a pleasant 74-year-old comes in for follow-up for right shoulder pain and concerns of adhesive capsulitis.  Patient received a right shoulder injection as well as was started on a steroid pack back on 10/15/2024.  She was instructed on home exercise program.  She is here today for follow-up.  She states about 2 days after the injection she started developing increased range of motion and decreasing levels of pain.  Patient today is doing well.  No new issues.    Past Medical History:        Diagnosis Date    Bursitis     Cancer (HCC)     Carpal tunnel syndrome     GERD (gastroesophageal reflux disease)     High cholesterol     Thyroid disease        Past Surgical History:        Procedure Laterality Date    ANKLE FRACTURE SURGERY Left     BREAST LUMPECTOMY Left     BREAST RECONSTRUCTION      CARPAL TUNNEL RELEASE      HIP SURGERY      LYMPH NODE DISSECTION      TONSILLECTOMY         Current Medications:   Prior to Admission medications    Medication Sig Start Date End Date Taking? Authorizing Provider   levocetirizine (XYZAL) 5 MG tablet Take 1 tablet by mouth nightly 9/3/24   Kacie Huff, APRN - CNP   simvastatin (ZOCOR) 40 MG tablet 1 tablet in the evening Orally Once a day for 30 day(s)    ProviderJames MD   levothyroxine (SYNTHROID) 100 MCG tablet Take 1 tablet by mouth daily    James Morejon MD   pantoprazole (PROTONIX) 40 MG tablet BID Orally Once a day for 30 day(s)    ProviderJames MD   vitamin D3 (CHOLECALCIFEROL) 25 MCG (1000 UT) TABS tablet Take 1 tablet by mouth daily    ProviderJames MD       Allergies: Patient has no known allergies.    System Neg/Pos Details   Constitutional negative   Fatigue and Fever.   Respiratory negative   Cough and

## 2025-03-05 ENCOUNTER — OFFICE VISIT (OUTPATIENT)
Dept: GASTROENTEROLOGY | Facility: CLINIC | Age: 75
End: 2025-03-05
Payer: MEDICARE

## 2025-03-05 VITALS
HEIGHT: 63 IN | BODY MASS INDEX: 31.54 KG/M2 | DIASTOLIC BLOOD PRESSURE: 86 MMHG | TEMPERATURE: 97.3 F | SYSTOLIC BLOOD PRESSURE: 140 MMHG | OXYGEN SATURATION: 98 % | WEIGHT: 178 LBS | HEART RATE: 80 BPM

## 2025-03-05 DIAGNOSIS — R19.7 DIARRHEA, UNSPECIFIED TYPE: ICD-10-CM

## 2025-03-05 DIAGNOSIS — Z83.719 FAMILY HX COLONIC POLYPS: ICD-10-CM

## 2025-03-05 DIAGNOSIS — Z86.0100 HX OF COLONIC POLYP: Primary | ICD-10-CM

## 2025-03-05 NOTE — PROGRESS NOTES
[facility] Gastroenterology    Primary Physician Claudia Keane,     3/5/2025    Warren Quinn   1950      Chief Complaint   Patient presents with    Colonoscopy       Subjective     HPI    Warren Quinn is a 74 y.o. female who presents as a referral for preventative maintenance. She has no complaints of nausea or vomiting. No wt loss. No BRBPR. No melena. No abdominal pain.        In the last 5 years has had post prandial diarrhea. Hibachi and chinese will trigger. Kaopectate as needed helps.  Reports getting enough dietary fiber.       She has history of breast cancer 1998.       COLONOSCOPY (01/20/2020 08:28) recall 5 years.   Tissue Pathology Exam (01/20/2020 08:36) hyperplastic.      Father had colon polyps.   No family history of colon cancer.     Past Medical History:   Diagnosis Date    Arthritis     Breast cancer     Disease of thyroid gland     GERD (gastroesophageal reflux disease)     Hyperlipidemia     Kidney stone     Osteopenia        Past Surgical History:   Procedure Laterality Date    ANKLE SURGERY      BREAST AUGMENTATION      BREAST LUMPECTOMY      BREAST RECONSTRUCTION      CARPAL TUNNEL RELEASE      CATARACT EXTRACTION      COLONOSCOPY  12/05/2014    COLONOSCOPY N/A 01/20/2020    Procedure: COLONOSCOPY WITH ANESTHESIA;  Surgeon: Elmer Crawley MD;  Location: [facility] ENDOSCOPY;  Service: Gastroenterology    ENDOSCOPY N/A 05/01/2018    Procedure: ESOPHAGOGASTRODUODENOSCOPY WITH ANESTHESIA;  Surgeon: Elmer Crawley MD;  Location: [facility] ENDOSCOPY;  Service: Gastroenterology    HAND SURGERY      3rd finger partial amputation    KIDNEY STONE SURGERY      TONSILLECTOMY      TOTAL HIP ARTHROPLASTY      TUBAL ABDOMINAL LIGATION         Outpatient Medications Marked as Taking for the 3/5/25 encounter (Office Visit) with Susanne Adkins APRN   Medication Sig Dispense Refill    cholecalciferol (VITAMIN D3) 1000 units tablet Take 1 tablet by mouth Daily.       levothyroxine (SYNTHROID, LEVOTHROID) 100 MCG tablet Take 1 tablet by mouth Daily.      naproxen (NAPROSYN) 250 MG tablet Take 1 tablet by mouth As Needed (rare).      pantoprazole (PROTONIX) 40 MG EC tablet Take 1 tablet by mouth 2 (Two) Times a Day.      simvastatin (ZOCOR) 40 MG tablet Take 1 tablet by mouth Every Night.         No Known Allergies    Social History     Socioeconomic History    Marital status: Single   Tobacco Use    Smoking status: Former    Smokeless tobacco: Never   Substance and Sexual Activity    Alcohol use: Not Currently    Drug use: No    Sexual activity: Defer       Family History   Problem Relation Age of Onset    Colon polyps Father     Colon cancer Neg Hx        Review of Systems   Constitutional:  Negative for chills, fever and unexpected weight change.   Respiratory:  Negative for shortness of breath.    Cardiovascular:  Negative for chest pain.   Gastrointestinal:  Negative for abdominal distention, abdominal pain, anal bleeding, blood in stool, constipation, nausea and vomiting.       Objective     Vitals:    03/05/25 1021   BP: 140/86   Pulse: 80   Temp: 97.3 °F (36.3 °C)   SpO2: 98%         03/05/25  1021   Weight: 80.7 kg (178 lb)     Body mass index is 31.53 kg/m².    Physical Exam  Vitals reviewed.   Constitutional:       General: She is not in acute distress.  Cardiovascular:      Rate and Rhythm: Normal rate and regular rhythm.      Heart sounds: Normal heart sounds.   Pulmonary:      Effort: Pulmonary effort is normal.      Breath sounds: Normal breath sounds.   Abdominal:      General: Bowel sounds are normal. There is no distension.      Palpations: Abdomen is soft.      Tenderness: There is no abdominal tenderness.   Skin:     General: Skin is warm and dry.   Neurological:      Mental Status: She is alert.         Imaging Results (Most Recent)       None            Assessment & Plan     Diagnoses and all orders for this visit:    1. Hx of colonic polyp (Primary)  -      Case Request; Standing  -     Case Request    2. Family hx colonic polyps  -     Case Request; Standing  -     Case Request    3. Diarrhea, unspecified type  Comments:  post prandial,  chronic.    Other orders  -     Implement Anesthesia Orders Day of Procedure; Standing  -     Follow Anesthesia Guidelines / Protocol; Future      Schedule colonoscopy. Miralax prep. Hold naproxen 1 week prior to procedure.        In regards to chronic post prandial diarrhea,  I suspect this is food intolerance or IBS.  I recommend trial of fod map diet. She thinks is getting at least 10 gm fiber daily.  May try otc probiotic.  We also discussed trial of bentyl with adverse effects. She declines trial of bentyl.             COLONOSCOPY WITH ANESTHESIA (N/A)  All risks, benefits, alternatives, and indications of colonoscopy procedure have been discussed with the patient. Risks to include perforation of the colon requiring possible surgery or colostomy, risk of bleeding from biopsies or removal of colon tissue, possibility of missing a colon polyp or cancer, or adverse drug reaction.  Benefits to include the diagnosis and management of disease of the colon and rectum. Alternatives to include barium enema, radiographic evaluation, lab testing or no intervention. Pt verbalizes understanding and agrees.     There are no Patient Instructions on file for this visit.    JORDAN Santa

## 2025-04-09 ENCOUNTER — HOSPITAL ENCOUNTER (OUTPATIENT)
Dept: MAMMOGRAPHY | Facility: HOSPITAL | Age: 75
Discharge: HOME | End: 2025-04-09
Payer: MEDICARE

## 2025-04-09 ENCOUNTER — HOSPITAL ENCOUNTER (OUTPATIENT)
Dept: ULTRASOUND IMAGING | Facility: HOSPITAL | Age: 75
Discharge: HOME | End: 2025-04-09
Payer: MEDICARE

## 2025-04-09 DIAGNOSIS — R92.8 ABNORMAL MAMMOGRAM: ICD-10-CM

## 2025-04-09 PROCEDURE — 88341 IMHCHEM/IMCYTCHM EA ADD ANTB: CPT | Performed by: FAMILY MEDICINE

## 2025-04-09 PROCEDURE — 88305 TISSUE EXAM BY PATHOLOGIST: CPT | Performed by: FAMILY MEDICINE

## 2025-04-09 PROCEDURE — 76642 ULTRASOUND BREAST LIMITED: CPT

## 2025-04-09 PROCEDURE — A4648 IMPLANTABLE TISSUE MARKER: HCPCS

## 2025-04-09 PROCEDURE — 88342 IMHCHEM/IMCYTCHM 1ST ANTB: CPT | Performed by: FAMILY MEDICINE

## 2025-04-09 RX ORDER — LIDOCAINE HYDROCHLORIDE AND EPINEPHRINE 10; 10 MG/ML; UG/ML
10 INJECTION, SOLUTION INFILTRATION; PERINEURAL ONCE
Status: ACTIVE | OUTPATIENT
Start: 2025-04-09

## 2025-04-09 RX ORDER — LIDOCAINE HYDROCHLORIDE 10 MG/ML
10 INJECTION, SOLUTION INFILTRATION; PERINEURAL ONCE
Status: ACTIVE | OUTPATIENT
Start: 2025-04-09

## 2025-04-10 LAB
LAB AP CASE REPORT: NORMAL
LAB AP CLINICAL INFORMATION: NORMAL
Lab: NORMAL
PATH REPORT.FINAL DX SPEC: NORMAL
PATH REPORT.GROSS SPEC: NORMAL

## 2025-04-16 ENCOUNTER — CLINICAL DOCUMENTATION (OUTPATIENT)
Dept: HEMATOLOGY | Age: 75
End: 2025-04-16

## 2025-04-16 NOTE — PROGRESS NOTES
New patient referral received from Elana Cobb NP from [facility] for a diagnosis of breast adenocarcinoma.  Spoke with patient who states that she has decided to seek oncological management elsewhere and does not wish to make an appointment at [facility].  She understands that she can call clinic if she should change her mind.

## 2025-04-22 ENCOUNTER — TELEPHONE (OUTPATIENT)
Dept: GASTROENTEROLOGY | Facility: CLINIC | Age: 75
End: 2025-04-22

## 2025-04-22 NOTE — TELEPHONE ENCOUNTER
Caller: Warren Quinn    Relationship to patient: Self    Best call back number: [phone]    Patient is needing: PT'S COLONOSCOPY ON 4/29 WAS SUPPOSED TO HAVE BEEN CANCELLED, PLEASE ADVISE.

## 2025-04-23 NOTE — PROGRESS NOTES
[facility] HEMATOLOGY & ONCOLOGY  [address]  [place] [facility] [address] [phone]    Patient Name: Warren Quinn  Encounter Date: 04/30/2025  YOB: 1950  Patient Number: [id number]    REASON FOR CONSULTATION: Ductal carcinoma right breast    HISTORY OF PRESENT ILLNESS:  Warren Qunin is a 74 yoF with hypothyroidism, GERD, hyperlipidemia, kidney stones, osteopenia, arthritis, chronic postprandial diarrhea/IBS, history of left breast cancer    -3/21/2025- Mammogram-right breast asymmetry at 7 AM in the right breast measuring about 1 cm in size.  Right CC focal compression, right MLO focal compression and right true lateral 2D and 3D sequences recommended.  Ultrasound if persistent.  Stable left mammogram.    -4/9/2025- Right breast, mass at 7:00 (image-guided core biopsies):       -Invasive adenocarcinoma, consistent with mammary carcinoma, no special type (ductal).       -Intermediate combined histologic grade (G2).       -Carcinoma involves 3 of 3 cores (at least 9 mm). ER 90%+, TX 91%+, HER2 2+ equivocal/low-FISH negative    - 4/30/2025- Patient is seen for management considerations.     I have reviewed the HPI and verified with the patient the accuracy of it. No changes to interval history since the information was documented. Woody Hi MD 04/30/25      LABS    Lab Results - Last 18 Months   Lab Units 05/29/24  1343   CREATININE mg/dL 0.90         PAST MEDICAL HISTORY:  ALLERGIES:  No Known Allergies  CURRENT MEDICATIONS:  Outpatient Encounter Medications as of 4/30/2025   Medication Sig Dispense Refill    cholecalciferol (VITAMIN D3) 1000 units tablet Take 1 tablet by mouth Daily.      levothyroxine (SYNTHROID, LEVOTHROID) 100 MCG tablet Take 1 tablet by mouth Daily.      naproxen (NAPROSYN) 250 MG tablet Take 1 tablet by mouth As Needed (rare).      pantoprazole (PROTONIX) 40 MG EC tablet Take 1 tablet by mouth 2  (Two) Times a Day.      simvastatin (ZOCOR) 40 MG tablet Take 1 tablet by mouth Every Night.       Facility-Administered Encounter Medications as of 4/30/2025   Medication Dose Route Frequency Provider Last Rate Last Admin    lidocaine (XYLOCAINE) 1 % injection 10 mL  10 mL Subcutaneous Once Darvin Jose MD        lidocaine 1% - EPINEPHrine 1:248108 (XYLOCAINE W/EPI) 1 %-1:938375 injection 10 mL  10 mL Injection Once Darvin Jose MD         ADULT ILLNESSES:  Patient Active Problem List   Diagnosis Code    Gastroesophageal reflux disease K21.9    Family hx colonic polyps Z83.719    Hx of colonic polyp Z86.0100     SURGERIES:  Past Surgical History:   Procedure Laterality Date    ANKLE SURGERY      BREAST AUGMENTATION      BREAST LUMPECTOMY      BREAST RECONSTRUCTION      CARPAL TUNNEL RELEASE      CATARACT EXTRACTION      COLONOSCOPY  12/05/2014    COLONOSCOPY N/A 01/20/2020    Procedure: COLONOSCOPY WITH ANESTHESIA;  Surgeon: Elmer Crawley MD;  Location: [facility] ENDOSCOPY;  Service: Gastroenterology    ENDOSCOPY N/A 05/01/2018    Procedure: ESOPHAGOGASTRODUODENOSCOPY WITH ANESTHESIA;  Surgeon: Elmer Crawley MD;  Location: [facility] ENDOSCOPY;  Service: Gastroenterology    HAND SURGERY      3rd finger partial amputation    KIDNEY STONE SURGERY      TONSILLECTOMY      TOTAL HIP ARTHROPLASTY      TUBAL ABDOMINAL LIGATION       HEALTH MAINTENANCE ITEMS:  Health Maintenance Due   Topic Date Due    DXA SCAN  Never done    Pneumococcal Vaccine 50+ (1 of 1 - PCV) Never done    HEPATITIS C SCREENING  Never done    ANNUAL WELLNESS VISIT  05/29/2020    RSV Vaccine - Adults (1 - 1-dose 75+ series) Never done    COVID-19 Vaccine (5 - 2024-25 season) 09/01/2024    COLORECTAL CANCER SCREENING  01/20/2025       <no information>  Last Completed Colonoscopy    This patient has no relevant Health Maintenance data.       Immunization History   Administered Date(s) Administered    COVID-19 (MODERNA) 1st,2nd,3rd Dose  "Monovalent 2021, 2021    COVID-19 (MODERNA) Monovalent Original Booster 11/15/2021, 2022     Last Completed Mammogram            Completed or No Longer Recommended       MAMMOGRAM  Discontinued        Frequency changed to Never automatically (Topic No Longer Applies)    2025  MAMMO Scan    10/30/2018  Outside Claim: [facility] SCREENING DIGITAL BREAST TOMOSYNTHESIS BI    10/30/2018  Outside Claim: [facility] MAMMOGRAM SCREENING BILAT DIGITAL W CAD    2017  Outside Claim: [place] TOMOSYNTHESIS MAMMOGRAPHY     Only the first 5 history entries have been loaded, but more history exists.                            FAMILY HISTORY:  Family History   Problem Relation Age of Onset    No Known Problems Mother     Colon polyps Father     Heart disease Father     Heart disease Brother     Colon cancer Neg Hx      SOCIAL HISTORY:  Social History     Socioeconomic History    Marital status: Single   Tobacco Use    Smoking status: Former    Smokeless tobacco: Never   Substance and Sexual Activity    Alcohol use: Not Currently    Drug use: No    Sexual activity: Defer       REVIEW OF SYSTEMS:  Review of Systems   Constitutional:  Negative for activity change and fatigue.        Manages her ADLs to include chores, errands and driving.  Is up and about, \"all the time.\"   HENT: Negative.     Eyes: Negative.    Respiratory: Negative.     Cardiovascular: Negative.    Gastrointestinal:  Positive for diarrhea (Intermittent.  IBS).   Endocrine: Negative.         G2,     Menarche age 11    Menopause 1998   Genitourinary: Negative.    Musculoskeletal:  Positive for arthralgias (Chronic).   Skin: Negative.    Allergic/Immunologic: Negative.    Neurological:  Positive for tremors (Essential).   Hematological: Negative.    Psychiatric/Behavioral: Negative.         /70   Pulse 85   Temp 98.3 °F (36.8 °C) (Temporal)   Resp 18   Ht 160 cm (63\")   Wt 78.2 kg (172 lb 8 oz)   SpO2 95%   BMI 30.56 kg/m²  Body surface area " is 1.82 meters squared.  Pain Score    04/30/25 0947   PainSc: 0-No pain       Physical Exam:  Physical Exam  Vitals and nursing note reviewed. Exam conducted with a chaperone present.   Constitutional:       Comments: Pleasant, cooperative, heavyset, modestly kept elderly female.  Ambulatory.  ECOG 0.  She is here with her daughter Joi FONTANEZ:      Head: Normocephalic and atraumatic.   Eyes:      General: No scleral icterus.     Extraocular Movements: Extraocular movements intact.      Pupils: Pupils are equal, round, and reactive to light.   Cardiovascular:      Rate and Rhythm: Normal rate.   Pulmonary:      Effort: Pulmonary effort is normal.   Chest:          Comments: Chaperoned exam: Cristy Hart MA    Right breast is notable for discrete nodularity at the 7 o'clock position.  No tenderness.  No nipple discharge.    Left breast is notable for previous lumpectomy and radiation associated skin thickening/pigmentation.    No associated axillary/supraclavicular adenopathy.  Abdominal:      Palpations: Abdomen is soft.   Musculoskeletal:         General: Normal range of motion.      Cervical back: Normal range of motion.   Lymphadenopathy:      Upper Body:      Right upper body: No supraclavicular or axillary adenopathy.      Left upper body: No supraclavicular or axillary adenopathy.   Skin:     General: Skin is warm.   Neurological:      General: No focal deficit present.      Mental Status: She is alert and oriented to person, place, and time.   Psychiatric:         Mood and Affect: Mood normal.         Behavior: Behavior normal.         Thought Content: Thought content normal.         Assessment:  Mammary carcinoma, no special type (ductal)  -- Original tumor stage: AJCC-at least IA ([id number]) ER 90%+, IL 91%+, HER2 2+ equivocal/low-FISH p.  --Original tumor burden:  -3/21/2025- Mammogram-right breast asymmetry at 7 AM in the right breast measuring about 1 cm in size.  Right CC focal compression, right  MLO focal compression and right true lateral 2D and 3D sequences recommended.  Ultrasound if persistent.  Stable left mammogram.    -4/9/2025- Right breast, mass at 7:00 (image-guided core biopsies):       -Invasive adenocarcinoma, consistent with mammary carcinoma, no special type (ductal).       -Intermediate combined histologic grade (G2).       -Carcinoma involves 3 of 3 cores (at least 9 mm). ER 90%+, TX 91%+, HER2 2+ equivocal/low-FISH negative    2.  History of left breast cancer.  No details  3.  IBS  4.  Osteopenia  5.  Hypothyroidism    Plan:  Reviewed limited available diagnostic information as outlined above.  Baseline CBC with differential, CMP, CEA, CA 27-29  Schedule MRI breasts bilaterally  Schedule staging CT chest, abdomen/pelvis with IV contrast and baseline DEXA scan  Send biopsy right breast mass for Oncotype DX recurrence score  Refer to Dr. Sharma Re: Surgical assessment  Discussed the rationale and potential toxicities of aromatase inhibitors (increased risk for fractures, bone/joint pain, etc.) discussed at length.  Questions answered.  She agrees to a trial of therapy.    Rx:   Arimidex 1 mg p.o. daily dispense 30 x 11 RF                Os-Augustin 600/400 p.o. twice daily dispense 60 x 11 RF  Return to office in 4-5 weeks for further recommendations        I spent ~66 minutes caring for Warren on this date of service. This time includes time spent by me in the following activities: preparing for the visit, reviewing tests, performing a medically appropriate examination and/or evaluation, counseling and educating the patient/family/caregiver, ordering medications, tests, or procedures and documenting information in the medical record.

## 2025-04-30 ENCOUNTER — CONSULT (OUTPATIENT)
Dept: ONCOLOGY | Facility: CLINIC | Age: 75
End: 2025-04-30
Payer: MEDICARE

## 2025-04-30 ENCOUNTER — LAB (OUTPATIENT)
Dept: LAB | Facility: HOSPITAL | Age: 75
End: 2025-04-30
Payer: MEDICARE

## 2025-04-30 VITALS
RESPIRATION RATE: 18 BRPM | OXYGEN SATURATION: 95 % | DIASTOLIC BLOOD PRESSURE: 70 MMHG | WEIGHT: 172.5 LBS | SYSTOLIC BLOOD PRESSURE: 122 MMHG | HEART RATE: 85 BPM | TEMPERATURE: 98.3 F | HEIGHT: 63 IN | BODY MASS INDEX: 30.56 KG/M2

## 2025-04-30 DIAGNOSIS — C50.911 DUCTAL CARCINOMA OF RIGHT BREAST: Primary | ICD-10-CM

## 2025-04-30 DIAGNOSIS — C50.911 DUCTAL CARCINOMA OF RIGHT BREAST: ICD-10-CM

## 2025-04-30 LAB
ALBUMIN SERPL-MCNC: 4.3 G/DL (ref 3.5–5.2)
ALBUMIN/GLOB SERPL: 1.4 G/DL
ALP SERPL-CCNC: 69 U/L (ref 39–117)
ALT SERPL W P-5'-P-CCNC: 16 U/L (ref 1–33)
ANION GAP SERPL CALCULATED.3IONS-SCNC: 11 MMOL/L (ref 5–15)
AST SERPL-CCNC: 20 U/L (ref 1–32)
BASOPHILS # BLD AUTO: 0.05 10*3/MM3 (ref 0–0.2)
BASOPHILS NFR BLD AUTO: 0.7 % (ref 0–1.5)
BILIRUB SERPL-MCNC: 0.3 MG/DL (ref 0–1.2)
BUN SERPL-MCNC: 19 MG/DL (ref 8–23)
BUN/CREAT SERPL: 21.3 (ref 7–25)
CALCIUM SPEC-SCNC: 9.7 MG/DL (ref 8.6–10.5)
CEA SERPL-MCNC: 2.23 NG/ML
CHLORIDE SERPL-SCNC: 104 MMOL/L (ref 98–107)
CO2 SERPL-SCNC: 27 MMOL/L (ref 22–29)
CREAT SERPL-MCNC: 0.89 MG/DL (ref 0.57–1)
DEPRECATED RDW RBC AUTO: 45.8 FL (ref 37–54)
EGFRCR SERPLBLD CKD-EPI 2021: 67.3 ML/MIN/1.73
EOSINOPHIL # BLD AUTO: 0.09 10*3/MM3 (ref 0–0.4)
EOSINOPHIL NFR BLD AUTO: 1.2 % (ref 0.3–6.2)
ERYTHROCYTE [DISTWIDTH] IN BLOOD BY AUTOMATED COUNT: 13.5 % (ref 12.3–15.4)
GLOBULIN UR ELPH-MCNC: 3 GM/DL
GLUCOSE SERPL-MCNC: 110 MG/DL (ref 65–99)
HCT VFR BLD AUTO: 43 % (ref 34–46.6)
HGB BLD-MCNC: 13.9 G/DL (ref 12–15.9)
IMM GRANULOCYTES # BLD AUTO: 0.01 10*3/MM3 (ref 0–0.05)
IMM GRANULOCYTES NFR BLD AUTO: 0.1 % (ref 0–0.5)
LYMPHOCYTES # BLD AUTO: 2.32 10*3/MM3 (ref 0.7–3.1)
LYMPHOCYTES NFR BLD AUTO: 31.7 % (ref 19.6–45.3)
MCH RBC QN AUTO: 29.7 PG (ref 26.6–33)
MCHC RBC AUTO-ENTMCNC: 32.3 G/DL (ref 31.5–35.7)
MCV RBC AUTO: 91.9 FL (ref 79–97)
MONOCYTES # BLD AUTO: 0.55 10*3/MM3 (ref 0.1–0.9)
MONOCYTES NFR BLD AUTO: 7.5 % (ref 5–12)
NEUTROPHILS NFR BLD AUTO: 4.3 10*3/MM3 (ref 1.7–7)
NEUTROPHILS NFR BLD AUTO: 58.8 % (ref 42.7–76)
NRBC BLD AUTO-RTO: 0 /100 WBC (ref 0–0.2)
PLATELET # BLD AUTO: 286 10*3/MM3 (ref 140–450)
PMV BLD AUTO: 11.3 FL (ref 6–12)
POTASSIUM SERPL-SCNC: 4.5 MMOL/L (ref 3.5–5.2)
PROT SERPL-MCNC: 7.3 G/DL (ref 6–8.5)
RBC # BLD AUTO: 4.68 10*6/MM3 (ref 3.77–5.28)
SODIUM SERPL-SCNC: 142 MMOL/L (ref 136–145)
WBC NRBC COR # BLD AUTO: 7.32 10*3/MM3 (ref 3.4–10.8)

## 2025-04-30 PROCEDURE — 82378 CARCINOEMBRYONIC ANTIGEN: CPT

## 2025-04-30 PROCEDURE — 86300 IMMUNOASSAY TUMOR CA 15-3: CPT

## 2025-04-30 PROCEDURE — 85025 COMPLETE CBC W/AUTO DIFF WBC: CPT

## 2025-04-30 PROCEDURE — 80053 COMPREHEN METABOLIC PANEL: CPT

## 2025-04-30 PROCEDURE — 36415 COLL VENOUS BLD VENIPUNCTURE: CPT

## 2025-04-30 RX ORDER — ANASTROZOLE 1 MG/1
1 TABLET ORAL DAILY
Qty: 30 TABLET | Refills: 11 | Status: SHIPPED | OUTPATIENT
Start: 2025-04-30

## 2025-05-01 LAB
CANCER AG27-29 SERPL-ACNC: 18.3 U/ML (ref 0–38.6)
LAB AP CASE REPORT: NORMAL
LAB AP CLINICAL INFORMATION: NORMAL
Lab: NORMAL
PATH REPORT.FINAL DX SPEC: NORMAL
PATH REPORT.GROSS SPEC: NORMAL

## 2025-05-05 ENCOUNTER — OFFICE VISIT (OUTPATIENT)
Dept: SURGERY | Facility: CLINIC | Age: 75
End: 2025-05-05
Payer: MEDICARE

## 2025-05-05 ENCOUNTER — TELEPHONE (OUTPATIENT)
Dept: SURGERY | Facility: CLINIC | Age: 75
End: 2025-05-05
Payer: MEDICARE

## 2025-05-05 ENCOUNTER — HOSPITAL ENCOUNTER (OUTPATIENT)
Dept: MRI IMAGING | Facility: HOSPITAL | Age: 75
Discharge: HOME | End: 2025-05-05
Admitting: INTERNAL MEDICINE
Payer: MEDICARE

## 2025-05-05 ENCOUNTER — PATIENT ROUNDING (BHMG ONLY) (OUTPATIENT)
Dept: SURGERY | Facility: CLINIC | Age: 75
End: 2025-05-05
Payer: MEDICARE

## 2025-05-05 VITALS
BODY MASS INDEX: 30.48 KG/M2 | OXYGEN SATURATION: 95 % | HEIGHT: 63 IN | WEIGHT: 172 LBS | DIASTOLIC BLOOD PRESSURE: 84 MMHG | HEART RATE: 77 BPM | SYSTOLIC BLOOD PRESSURE: 144 MMHG

## 2025-05-05 DIAGNOSIS — Z85.3 PERSONAL HISTORY OF BREAST CANCER: ICD-10-CM

## 2025-05-05 DIAGNOSIS — E66.9 OBESITY (BMI 30-39.9): ICD-10-CM

## 2025-05-05 DIAGNOSIS — C50.911 DUCTAL CARCINOMA OF RIGHT BREAST: ICD-10-CM

## 2025-05-05 DIAGNOSIS — Z17.0 MALIGNANT NEOPLASM OF LOWER-OUTER QUADRANT OF RIGHT BREAST OF FEMALE, ESTROGEN RECEPTOR POSITIVE: Primary | ICD-10-CM

## 2025-05-05 DIAGNOSIS — C50.511 MALIGNANT NEOPLASM OF LOWER-OUTER QUADRANT OF RIGHT BREAST OF FEMALE, ESTROGEN RECEPTOR POSITIVE: Primary | ICD-10-CM

## 2025-05-05 PROCEDURE — A9579 GAD-BASE MR CONTRAST NOS,1ML: HCPCS | Performed by: INTERNAL MEDICINE

## 2025-05-05 PROCEDURE — 76376 3D RENDER W/INTRP POSTPROCES: CPT

## 2025-05-05 PROCEDURE — C8937 CAD BREAST MRI: HCPCS

## 2025-05-05 PROCEDURE — C8908 MRI W/O FOL W/CONT, BREAST,: HCPCS

## 2025-05-05 PROCEDURE — 25510000001 GADOPICLENOL 0.5 MMOL/ML SOLUTION: Performed by: INTERNAL MEDICINE

## 2025-05-05 RX ORDER — ENOXAPARIN SODIUM 100 MG/ML
40 INJECTION SUBCUTANEOUS DAILY
OUTPATIENT
Start: 2025-05-05

## 2025-05-05 RX ADMIN — GADOPICLENOL 7.5 ML: 485.1 INJECTION INTRAVENOUS at 10:59

## 2025-05-05 NOTE — PROGRESS NOTES
Office New Patient History and Physical:     Referring Provider: Woody Hi MD    Chief Complaint   Patient presents with    Breast Cancer        Subjective .     History of present illness:    History of Present Illness  The patient presents for evaluation of right breast mass. She is accompanied by her friend.    She has a known history of left breast cancer, diagnosed on 05/11/1998, which was treated with a lumpectomy and axillary dissection, followed by adjuvant chemotherapy and radiation therapy. A total of 17 lymph nodes were removed during the surgical procedure. She did not receive tamoxifen as part of her treatment regimen. She was previously on Prempro, but it was discontinued. She has undergone a right breast reduction surgery to achieve symmetry with the contralateral breast, which has a small implant (left breast)     Recently, she self-detected a mass in her right breast approximately 4 days prior to a scheduled mammogram. She reports no associated nipple discharge. She is currently not on any hormone therapy. She has been initiated on anastrozole as part of her current treatment plan.    SOCIAL HISTORY  She quit smoking 27 years ago.    FAMILY HISTORY  She does not have any family members with breast cancer.    MEDICATIONS  Current: Anastrozole.  Past: Prempro.        Review of Systems    Review of Systems - General ROS: negative  ENT ROS: negative  Respiratory ROS: no cough, shortness of breath, or wheezing  Cardiovascular ROS: no chest pain or dyspnea on exertion  Gastrointestinal ROS: no abdominal pain, change in bowel habits, or black or bloody stools  Genito-Urinary ROS: no dysuria, trouble voiding, or hematuria  Dermatological ROS: negative   Breast ROS: positive for biopsy proven breast cancer   Hematological and Lymphatic ROS: negative  Musculoskeletal ROS: negative   Neurological ROS: no TIA or stroke symptoms    Psychological ROS: negative  Endocrine ROS: negative    History  Past  Medical History:   Diagnosis Date    Arthritis     Breast cancer     Disease of thyroid gland     GERD (gastroesophageal reflux disease)     Hyperlipidemia     Kidney stone     Osteopenia    ,   Past Surgical History:   Procedure Laterality Date    ANKLE SURGERY      BREAST AUGMENTATION      BREAST LUMPECTOMY      BREAST RECONSTRUCTION      CARPAL TUNNEL RELEASE      CATARACT EXTRACTION      COLONOSCOPY  12/05/2014    COLONOSCOPY N/A 01/20/2020    Procedure: COLONOSCOPY WITH ANESTHESIA;  Surgeon: Elmer Crawley MD;  Location: [facility] ENDOSCOPY;  Service: Gastroenterology    ENDOSCOPY N/A 05/01/2018    Procedure: ESOPHAGOGASTRODUODENOSCOPY WITH ANESTHESIA;  Surgeon: Elmer Crawley MD;  Location: [facility] ENDOSCOPY;  Service: Gastroenterology    HAND SURGERY      3rd finger partial amputation    KIDNEY STONE SURGERY      TONSILLECTOMY      TOTAL HIP ARTHROPLASTY      TUBAL ABDOMINAL LIGATION     ,   Family History   Problem Relation Age of Onset    No Known Problems Mother     Colon polyps Father     Heart disease Father     Heart disease Brother     Colon cancer Neg Hx    ,   Social History     Tobacco Use    Smoking status: Former    Smokeless tobacco: Never   Substance Use Topics    Alcohol use: Not Currently    Drug use: No   , (Not in a hospital admission)   and Allergies:  Patient has no known allergies.    Current Outpatient Medications:     anastrozole (ARIMIDEX) 1 MG tablet, Take 1 tablet by mouth Daily., Disp: 30 tablet, Rfl: 11    Calcium Carbonate-Vitamin D 600-10 MG-MCG per tablet, Take 1 tablet by mouth 2 (Two) Times a Day., Disp: 60 tablet, Rfl: 11    cholecalciferol (VITAMIN D3) 1000 units tablet, Take 1 tablet by mouth Daily., Disp: , Rfl:     levothyroxine (SYNTHROID, LEVOTHROID) 100 MCG tablet, Take 1 tablet by mouth Daily., Disp: , Rfl:     naproxen (NAPROSYN) 250 MG tablet, Take 1 tablet by mouth As Needed (rare)., Disp: , Rfl:     pantoprazole (PROTONIX) 40 MG EC tablet, Take 1 tablet by  "mouth 2 (Two) Times a Day., Disp: , Rfl:     simvastatin (ZOCOR) 40 MG tablet, Take 1 tablet by mouth Every Night., Disp: , Rfl:     Current Facility-Administered Medications:     lidocaine (XYLOCAINE) 1 % injection 10 mL, 10 mL, Subcutaneous, Once, Darvin Jose MD    lidocaine 1% - EPINEPHrine 1:519002 (XYLOCAINE W/EPI) 1 %-1:297658 injection 10 mL, 10 mL, Injection, Once, Darvin Jose MD    Objective     Vital Signs   /84   Pulse 77   Ht 160 cm (63\")   Wt 78 kg (172 lb)   SpO2 95%   BMI 30.47 kg/m²      Physical Exam:  General appearance - alert, well appearing, and in no distress  Mental status - alert, oriented to person, place, and time  Eyes - pupils equal and reactive, extraocular eye movements intact  Neurological - alert, oriented, normal speech, no focal findings or movement disorder noted  Breast Exam: s/p right reduction and left lumpectomy. Bilateral nipples everted. Patient examined in the supine position with the ipsilateral arm above the head. No palpable masses bilaterally. No nipple discharge bilaterally. No palpable axillary nor supraclavicular adenopathy bilaterally.     Results Review:    The following data was reviewed by: Madeline Sharma MD on 05/05/2025:  US Guided Breast Biopsy With & Without Device initial (04/09/2025 10:23)   Mammo Post Device Placement Right (04/09/2025 10:29)   US Breast Right Limited (04/09/2025 12:38)   Tissue Pathology Exam (04/09/2025 10:10)   Progress Notes by Woody Hi MD (04/30/2025 09:30)     Assessment & Plan       Diagnoses and all orders for this visit:    1. Malignant neoplasm of lower-outer quadrant of right breast of female, estrogen receptor positive (Primary)  -     Case Request; Standing  -     CBC & Differential; Future  -     Comprehensive Metabolic Panel; Future  -     ECG 12 Lead; Future  -     ceFAZolin (ANCEF) 2,000 mg in sodium chloride 0.9 % 100 mL IVPB  -     US Device Placement Breast Without Biopsy 1st; " Future  -     Mammo Post Device Placement Right; Future  -     MAMMO BREAST SPECIMEN; Future  -     enoxaparin sodium (LOVENOX) syringe 40 mg  -     Case Request  -     XR Breast Specimen; Future    2. Personal history of breast cancer    3. Obesity (BMI 30-39.9)    Other orders  -     Follow Anesthesia Guidelines / Protocol; Future  -     Follow Anesthesia Guidelines / Protocol; Standing  -     Provide Instructions to Patient on NPO Status; Future  -     Do Not Place IV or Blood Pressure Cuff on Affected Side of Patient With History of Breast Cancer; Standing  -     Contact Surgeon With Questions or Concerns; Standing  -     Verify / Perform Chlorhexidine Skin Prep; Standing  -     Verify NPO Status; Standing  -     XR Chest 1 View; Standing  -     Place Sequential Compression Device; Standing  -     Maintain Sequential Compression Device; Standing  -     XR Breast Specimen; Standing        Assessment & Plan  1. Right breast cancer.  The right breast mass is classified as stage I, measuring between 1 and 1.5 cm based on ultrasound and biopsy results. It is estrogen-positive (90%), progesterone-positive (91%), and HER2-negative, indicating a less aggressive receptor status. The lymph nodes appear normal on ultrasound. She is currently on anastrozole. The Oncotype test has been ordered, and the results will guide the decision regarding chemotherapy. If the Oncotype results indicate high risk, chemotherapy will be recommended; otherwise, it will not be necessary. Surgical options discussed include breast conservation or lumpectomy with sentinel lymph node biopsy, with the possibility of avoiding radiation if the final pathology shows the tumor is less than 2 cm and she can tolerate the hormone-blocking pill. The alternative option is a mastectomy, either unilateral or bilateral, with or without reconstruction. The patient prefers a lumpectomy. The procedure will be scheduled for 05/29/2025. A metallic seed will be  placed prior to surgery to guide the surgeon. Preoperative workup, including a chest x-ray, EKG, and blood work, will be conducted on the same day as the seed placement. An MRI will be performed today to gather more information. If the MRI results align with expectations, the surgery will proceed as planned. If there are any changes based on the MRI, she will be contacted.    2. History of left breast cancer.  The patient had a left breast cancer diagnosis in 1998, treated with a lumpectomy, axillary dissection, adjuvant chemotherapy, and radiation. No current issues were reported related to the previous cancer.    PROCEDURE  The patient underwent a lumpectomy and axillary dissection for left breast cancer on 05/11/1998, followed by adjuvant chemotherapy and radiation therapy. Additionally, she has had a breast reduction surgery to achieve symmetry with the contralateral breast, which has a small implant.    This is a life threatening diagnosis. I have reviewed the above imaging, pathology and note. I have ordered the above pre-work and seed placement. I have spent > 60 minutes on the date of service reviewing records and imaging, examining the patient and in discussion with the patient. This is a single serious chronic diagnosis which requires coordination of complex care.     BMI is >= 30 and <35. (Class 1 Obesity). The following options were offered after discussion;: weight loss educational material (shared in after visit summary)      Madeline Sharma MD  05/07/25  19:18 CDT    Patient or patient representative verbalized consent for the use of Ambient Listening during the visit with  Madeline Sharma MD for chart documentation. 5/7/2025  19:22 CDT

## 2025-05-05 NOTE — TELEPHONE ENCOUNTER
Left the patient a voicemail requesting she call us back at her earliest convenience to go over her surgery information.    CBC  05/05

## 2025-05-05 NOTE — H&P (VIEW-ONLY)
Office New Patient History and Physical:     Referring Provider: Woody Hi MD    Chief Complaint   Patient presents with    Breast Cancer        Subjective .     History of present illness:    History of Present Illness  The patient presents for evaluation of right breast mass. She is accompanied by her friend.    She has a known history of left breast cancer, diagnosed on 05/11/1998, which was treated with a lumpectomy and axillary dissection, followed by adjuvant chemotherapy and radiation therapy. A total of 17 lymph nodes were removed during the surgical procedure. She did not receive tamoxifen as part of her treatment regimen. She was previously on Prempro, but it was discontinued. She has undergone a right breast reduction surgery to achieve symmetry with the contralateral breast, which has a small implant (left breast)     Recently, she self-detected a mass in her right breast approximately 4 days prior to a scheduled mammogram. She reports no associated nipple discharge. She is currently not on any hormone therapy. She has been initiated on anastrozole as part of her current treatment plan.    SOCIAL HISTORY  She quit smoking 27 years ago.    FAMILY HISTORY  She does not have any family members with breast cancer.    MEDICATIONS  Current: Anastrozole.  Past: Prempro.        Review of Systems    Review of Systems - General ROS: negative  ENT ROS: negative  Respiratory ROS: no cough, shortness of breath, or wheezing  Cardiovascular ROS: no chest pain or dyspnea on exertion  Gastrointestinal ROS: no abdominal pain, change in bowel habits, or black or bloody stools  Genito-Urinary ROS: no dysuria, trouble voiding, or hematuria  Dermatological ROS: negative   Breast ROS: positive for biopsy proven breast cancer   Hematological and Lymphatic ROS: negative  Musculoskeletal ROS: negative   Neurological ROS: no TIA or stroke symptoms    Psychological ROS: negative  Endocrine ROS: negative    History  Past  Medical History:   Diagnosis Date    Arthritis     Breast cancer     Disease of thyroid gland     GERD (gastroesophageal reflux disease)     Hyperlipidemia     Kidney stone     Osteopenia    ,   Past Surgical History:   Procedure Laterality Date    ANKLE SURGERY      BREAST AUGMENTATION      BREAST LUMPECTOMY      BREAST RECONSTRUCTION      CARPAL TUNNEL RELEASE      CATARACT EXTRACTION      COLONOSCOPY  12/05/2014    COLONOSCOPY N/A 01/20/2020    Procedure: COLONOSCOPY WITH ANESTHESIA;  Surgeon: Elmer Crawley MD;  Location: [facility] ENDOSCOPY;  Service: Gastroenterology    ENDOSCOPY N/A 05/01/2018    Procedure: ESOPHAGOGASTRODUODENOSCOPY WITH ANESTHESIA;  Surgeon: Elmer Crawley MD;  Location: [facility] ENDOSCOPY;  Service: Gastroenterology    HAND SURGERY      3rd finger partial amputation    KIDNEY STONE SURGERY      TONSILLECTOMY      TOTAL HIP ARTHROPLASTY      TUBAL ABDOMINAL LIGATION     ,   Family History   Problem Relation Age of Onset    No Known Problems Mother     Colon polyps Father     Heart disease Father     Heart disease Brother     Colon cancer Neg Hx    ,   Social History     Tobacco Use    Smoking status: Former    Smokeless tobacco: Never   Substance Use Topics    Alcohol use: Not Currently    Drug use: No   , (Not in a hospital admission)   and Allergies:  Patient has no known allergies.    Current Outpatient Medications:     anastrozole (ARIMIDEX) 1 MG tablet, Take 1 tablet by mouth Daily., Disp: 30 tablet, Rfl: 11    Calcium Carbonate-Vitamin D 600-10 MG-MCG per tablet, Take 1 tablet by mouth 2 (Two) Times a Day., Disp: 60 tablet, Rfl: 11    cholecalciferol (VITAMIN D3) 1000 units tablet, Take 1 tablet by mouth Daily., Disp: , Rfl:     levothyroxine (SYNTHROID, LEVOTHROID) 100 MCG tablet, Take 1 tablet by mouth Daily., Disp: , Rfl:     naproxen (NAPROSYN) 250 MG tablet, Take 1 tablet by mouth As Needed (rare)., Disp: , Rfl:     pantoprazole (PROTONIX) 40 MG EC tablet, Take 1 tablet by  "mouth 2 (Two) Times a Day., Disp: , Rfl:     simvastatin (ZOCOR) 40 MG tablet, Take 1 tablet by mouth Every Night., Disp: , Rfl:     Current Facility-Administered Medications:     lidocaine (XYLOCAINE) 1 % injection 10 mL, 10 mL, Subcutaneous, Once, Darvin Jose MD    lidocaine 1% - EPINEPHrine 1:544916 (XYLOCAINE W/EPI) 1 %-1:729537 injection 10 mL, 10 mL, Injection, Once, Darvin Jose MD    Objective     Vital Signs   /84   Pulse 77   Ht 160 cm (63\")   Wt 78 kg (172 lb)   SpO2 95%   BMI 30.47 kg/m²      Physical Exam:  General appearance - alert, well appearing, and in no distress  Mental status - alert, oriented to person, place, and time  Eyes - pupils equal and reactive, extraocular eye movements intact  Neurological - alert, oriented, normal speech, no focal findings or movement disorder noted  Breast Exam: s/p right reduction and left lumpectomy. Bilateral nipples everted. Patient examined in the supine position with the ipsilateral arm above the head. No palpable masses bilaterally. No nipple discharge bilaterally. No palpable axillary nor supraclavicular adenopathy bilaterally.     Results Review:    The following data was reviewed by: Madeline Sharma MD on 05/05/2025:  US Guided Breast Biopsy With & Without Device initial (04/09/2025 10:23)   Mammo Post Device Placement Right (04/09/2025 10:29)   US Breast Right Limited (04/09/2025 12:38)   Tissue Pathology Exam (04/09/2025 10:10)   Progress Notes by Woody Hi MD (04/30/2025 09:30)     Assessment & Plan       Diagnoses and all orders for this visit:    1. Malignant neoplasm of lower-outer quadrant of right breast of female, estrogen receptor positive (Primary)  -     Case Request; Standing  -     CBC & Differential; Future  -     Comprehensive Metabolic Panel; Future  -     ECG 12 Lead; Future  -     ceFAZolin (ANCEF) 2,000 mg in sodium chloride 0.9 % 100 mL IVPB  -     US Device Placement Breast Without Biopsy 1st; " Future  -     Mammo Post Device Placement Right; Future  -     MAMMO BREAST SPECIMEN; Future  -     enoxaparin sodium (LOVENOX) syringe 40 mg  -     Case Request  -     XR Breast Specimen; Future    2. Personal history of breast cancer    3. Obesity (BMI 30-39.9)    Other orders  -     Follow Anesthesia Guidelines / Protocol; Future  -     Follow Anesthesia Guidelines / Protocol; Standing  -     Provide Instructions to Patient on NPO Status; Future  -     Do Not Place IV or Blood Pressure Cuff on Affected Side of Patient With History of Breast Cancer; Standing  -     Contact Surgeon With Questions or Concerns; Standing  -     Verify / Perform Chlorhexidine Skin Prep; Standing  -     Verify NPO Status; Standing  -     XR Chest 1 View; Standing  -     Place Sequential Compression Device; Standing  -     Maintain Sequential Compression Device; Standing  -     XR Breast Specimen; Standing        Assessment & Plan  1. Right breast cancer.  The right breast mass is classified as stage I, measuring between 1 and 1.5 cm based on ultrasound and biopsy results. It is estrogen-positive (90%), progesterone-positive (91%), and HER2-negative, indicating a less aggressive receptor status. The lymph nodes appear normal on ultrasound. She is currently on anastrozole. The Oncotype test has been ordered, and the results will guide the decision regarding chemotherapy. If the Oncotype results indicate high risk, chemotherapy will be recommended; otherwise, it will not be necessary. Surgical options discussed include breast conservation or lumpectomy with sentinel lymph node biopsy, with the possibility of avoiding radiation if the final pathology shows the tumor is less than 2 cm and she can tolerate the hormone-blocking pill. The alternative option is a mastectomy, either unilateral or bilateral, with or without reconstruction. The patient prefers a lumpectomy. The procedure will be scheduled for 05/29/2025. A metallic seed will be  placed prior to surgery to guide the surgeon. Preoperative workup, including a chest x-ray, EKG, and blood work, will be conducted on the same day as the seed placement. An MRI will be performed today to gather more information. If the MRI results align with expectations, the surgery will proceed as planned. If there are any changes based on the MRI, she will be contacted.    2. History of left breast cancer.  The patient had a left breast cancer diagnosis in 1998, treated with a lumpectomy, axillary dissection, adjuvant chemotherapy, and radiation. No current issues were reported related to the previous cancer.    PROCEDURE  The patient underwent a lumpectomy and axillary dissection for left breast cancer on 05/11/1998, followed by adjuvant chemotherapy and radiation therapy. Additionally, she has had a breast reduction surgery to achieve symmetry with the contralateral breast, which has a small implant.    This is a life threatening diagnosis. I have reviewed the above imaging, pathology and note. I have ordered the above pre-work and seed placement. I have spent > 60 minutes on the date of service reviewing records and imaging, examining the patient and in discussion with the patient. This is a single serious chronic diagnosis which requires coordination of complex care.     BMI is >= 30 and <35. (Class 1 Obesity). The following options were offered after discussion;: weight loss educational material (shared in after visit summary)      Madeline Sharma MD  05/07/25  19:18 CDT    Patient or patient representative verbalized consent for the use of Ambient Listening during the visit with  Madeline Sharma MD for chart documentation. 5/7/2025  19:22 CDT

## 2025-05-06 ENCOUNTER — TELEPHONE (OUTPATIENT)
Dept: SURGERY | Facility: CLINIC | Age: 75
End: 2025-05-06
Payer: MEDICARE

## 2025-05-06 ENCOUNTER — HOSPITAL ENCOUNTER (OUTPATIENT)
Dept: CT IMAGING | Facility: HOSPITAL | Age: 75
Discharge: HOME | End: 2025-05-06
Admitting: INTERNAL MEDICINE
Payer: MEDICARE

## 2025-05-06 DIAGNOSIS — C50.911 DUCTAL CARCINOMA OF RIGHT BREAST: ICD-10-CM

## 2025-05-06 LAB — CREAT BLDA-MCNC: 1.1 MG/DL (ref 0.6–1.3)

## 2025-05-06 PROCEDURE — 71260 CT THORAX DX C+: CPT

## 2025-05-06 PROCEDURE — 74177 CT ABD & PELVIS W/CONTRAST: CPT

## 2025-05-06 PROCEDURE — 25510000001 IOPAMIDOL 61 % SOLUTION: Performed by: INTERNAL MEDICINE

## 2025-05-06 PROCEDURE — 82565 ASSAY OF CREATININE: CPT

## 2025-05-06 RX ORDER — IOPAMIDOL 612 MG/ML
100 INJECTION, SOLUTION INTRAVASCULAR
Status: COMPLETED | OUTPATIENT
Start: 2025-05-06 | End: 2025-05-06

## 2025-05-06 RX ADMIN — IOPAMIDOL 100 ML: 612 INJECTION, SOLUTION INTRAVENOUS at 13:38

## 2025-05-06 NOTE — TELEPHONE ENCOUNTER
I left the patient a voicemail requesting she call us back at her earliest convenience to go over her surgery information.    CBC  05/06

## 2025-05-07 ENCOUNTER — RESULTS FOLLOW-UP (OUTPATIENT)
Dept: CT IMAGING | Facility: HOSPITAL | Age: 75
End: 2025-05-07
Payer: MEDICARE

## 2025-05-07 ENCOUNTER — TELEPHONE (OUTPATIENT)
Dept: SURGERY | Facility: CLINIC | Age: 75
End: 2025-05-07
Payer: MEDICARE

## 2025-05-07 NOTE — TELEPHONE ENCOUNTER
Contacted patient Warren Quinn, she was informed labs have been reviewed and noted Creatinine level decline, patient encouraged to increase her fluids, drink more water to help hydrate her kidneys, pt v/u of instructions.

## 2025-05-07 NOTE — TELEPHONE ENCOUNTER
Warren Quinn is scheduled for surgery with Dr. Sharma on 05/29/25 with an arrival time of 8.  You will check in at the main registration desk.   On the day of surgery you will need a .   We ask that you do not eat or drink anything after midnight on the day of surgery.   Please do not take any anti-inflammatory or weight loss medications, vitamins, ibuprofen, aleve, Advil, motrin, Excedrin, mobic, meloxicam for 7 days prior to surgery.   ____________________________________________________________________________________________________  Your pre-work appointment will be on 05/22/25 arrive at 8, you will check in at main registration.   For your pre-work appointment you do not have to fast.   For pre-work you do not need a . You will get some lab work and sign consent for surgery.   You may also get an EKG and/or chest xray if needed.   You will have your magseed placed on the same day as your prework in the [facility] at 10am.  If you have any questions do not hesitate to reach out.   You can reach us at [phone] hit option 2.       The patient voiced confirmation of surgery details.    [facility]  05/07

## 2025-05-07 NOTE — TELEPHONE ENCOUNTER
----- Message from Woody Hi sent at 5/6/2025  7:58 PM CDT -----  Orally hydrate 1 liter daily-repeat creatinine 1 week  ----- Message -----  From: Lab, Background User  Sent: 5/6/2025   4:48 PM CDT  To: Woody Hi MD

## 2025-05-08 NOTE — PATIENT INSTRUCTIONS

## 2025-05-22 ENCOUNTER — PRE-ADMISSION TESTING (OUTPATIENT)
Dept: PREADMISSION TESTING | Facility: HOSPITAL | Age: 75
End: 2025-05-22
Payer: MEDICARE

## 2025-05-22 ENCOUNTER — HOSPITAL ENCOUNTER (OUTPATIENT)
Dept: ULTRASOUND IMAGING | Facility: HOSPITAL | Age: 75
Discharge: HOME | End: 2025-05-22
Payer: MEDICARE

## 2025-05-22 ENCOUNTER — HOSPITAL ENCOUNTER (OUTPATIENT)
Dept: MAMMOGRAPHY | Facility: HOSPITAL | Age: 75
Discharge: HOME | End: 2025-05-22
Payer: MEDICARE

## 2025-05-22 ENCOUNTER — HOSPITAL ENCOUNTER (OUTPATIENT)
Dept: GENERAL RADIOLOGY | Facility: HOSPITAL | Age: 75
Discharge: HOME | End: 2025-05-22
Payer: MEDICARE

## 2025-05-22 VITALS
HEART RATE: 69 BPM | BODY MASS INDEX: 32.17 KG/M2 | DIASTOLIC BLOOD PRESSURE: 67 MMHG | HEIGHT: 62 IN | SYSTOLIC BLOOD PRESSURE: 162 MMHG | OXYGEN SATURATION: 97 % | RESPIRATION RATE: 16 BRPM | WEIGHT: 174.82 LBS

## 2025-05-22 DIAGNOSIS — C50.511 MALIGNANT NEOPLASM OF LOWER-OUTER QUADRANT OF RIGHT BREAST OF FEMALE, ESTROGEN RECEPTOR POSITIVE: ICD-10-CM

## 2025-05-22 DIAGNOSIS — Z17.0 MALIGNANT NEOPLASM OF LOWER-OUTER QUADRANT OF RIGHT BREAST OF FEMALE, ESTROGEN RECEPTOR POSITIVE: ICD-10-CM

## 2025-05-22 LAB
ALBUMIN SERPL-MCNC: 4.1 G/DL (ref 3.5–5.2)
ALBUMIN/GLOB SERPL: 1.4 G/DL
ALP SERPL-CCNC: 67 U/L (ref 39–117)
ALT SERPL W P-5'-P-CCNC: 17 U/L (ref 1–33)
ANION GAP SERPL CALCULATED.3IONS-SCNC: 12 MMOL/L (ref 5–15)
AST SERPL-CCNC: 21 U/L (ref 1–32)
BASOPHILS # BLD AUTO: 0.06 10*3/MM3 (ref 0–0.2)
BASOPHILS NFR BLD AUTO: 1.1 % (ref 0–1.5)
BILIRUB SERPL-MCNC: 0.4 MG/DL (ref 0–1.2)
BUN SERPL-MCNC: 15 MG/DL (ref 8–23)
BUN/CREAT SERPL: 17.9 (ref 7–25)
CALCIUM SPEC-SCNC: 9.5 MG/DL (ref 8.6–10.5)
CHLORIDE SERPL-SCNC: 105 MMOL/L (ref 98–107)
CO2 SERPL-SCNC: 25 MMOL/L (ref 22–29)
CREAT SERPL-MCNC: 0.84 MG/DL (ref 0.57–1)
DEPRECATED RDW RBC AUTO: 43 FL (ref 37–54)
EGFRCR SERPLBLD CKD-EPI 2021: 72.1 ML/MIN/1.73
EOSINOPHIL # BLD AUTO: 0.1 10*3/MM3 (ref 0–0.4)
EOSINOPHIL NFR BLD AUTO: 1.8 % (ref 0.3–6.2)
ERYTHROCYTE [DISTWIDTH] IN BLOOD BY AUTOMATED COUNT: 13 % (ref 12.3–15.4)
GLOBULIN UR ELPH-MCNC: 2.9 GM/DL
GLUCOSE SERPL-MCNC: 110 MG/DL (ref 65–99)
HCT VFR BLD AUTO: 40.2 % (ref 34–46.6)
HGB BLD-MCNC: 13.3 G/DL (ref 12–15.9)
IMM GRANULOCYTES # BLD AUTO: 0.01 10*3/MM3 (ref 0–0.05)
IMM GRANULOCYTES NFR BLD AUTO: 0.2 % (ref 0–0.5)
LYMPHOCYTES # BLD AUTO: 1.92 10*3/MM3 (ref 0.7–3.1)
LYMPHOCYTES NFR BLD AUTO: 34.5 % (ref 19.6–45.3)
MCH RBC QN AUTO: 30 PG (ref 26.6–33)
MCHC RBC AUTO-ENTMCNC: 33.1 G/DL (ref 31.5–35.7)
MCV RBC AUTO: 90.5 FL (ref 79–97)
MONOCYTES # BLD AUTO: 0.46 10*3/MM3 (ref 0.1–0.9)
MONOCYTES NFR BLD AUTO: 8.3 % (ref 5–12)
NEUTROPHILS NFR BLD AUTO: 3.01 10*3/MM3 (ref 1.7–7)
NEUTROPHILS NFR BLD AUTO: 54.1 % (ref 42.7–76)
NRBC BLD AUTO-RTO: 0 /100 WBC (ref 0–0.2)
PLATELET # BLD AUTO: 241 10*3/MM3 (ref 140–450)
PMV BLD AUTO: 10.3 FL (ref 6–12)
POTASSIUM SERPL-SCNC: 4.1 MMOL/L (ref 3.5–5.2)
PROT SERPL-MCNC: 7 G/DL (ref 6–8.5)
RBC # BLD AUTO: 4.44 10*6/MM3 (ref 3.77–5.28)
SODIUM SERPL-SCNC: 142 MMOL/L (ref 136–145)
WBC NRBC COR # BLD AUTO: 5.56 10*3/MM3 (ref 3.4–10.8)

## 2025-05-22 PROCEDURE — 36415 COLL VENOUS BLD VENIPUNCTURE: CPT

## 2025-05-22 PROCEDURE — 71045 X-RAY EXAM CHEST 1 VIEW: CPT

## 2025-05-22 PROCEDURE — 80053 COMPREHEN METABOLIC PANEL: CPT

## 2025-05-22 PROCEDURE — 93005 ELECTROCARDIOGRAM TRACING: CPT

## 2025-05-22 PROCEDURE — A4648 IMPLANTABLE TISSUE MARKER: HCPCS

## 2025-05-22 PROCEDURE — 93010 ELECTROCARDIOGRAM REPORT: CPT | Performed by: INTERNAL MEDICINE

## 2025-05-22 PROCEDURE — 85025 COMPLETE CBC W/AUTO DIFF WBC: CPT

## 2025-05-22 RX ORDER — LIDOCAINE HYDROCHLORIDE 10 MG/ML
10 INJECTION, SOLUTION INFILTRATION; PERINEURAL ONCE
Status: DISPENSED | OUTPATIENT
Start: 2025-05-22

## 2025-05-22 NOTE — DISCHARGE INSTRUCTIONS
Faxed letter to [facility] [phone]   Preparing for Surgery  Follow these instructions before the procedure:  Several days or weeks before your procedure        Ask your health care provider about:  Changing or stopping your regular medicines. This is especially important if you are taking diabetes medicines or blood thinners.  Taking medicines such as aspirin and ibuprofen. These medicines can thin your blood. Do not take these medicines unless your health care provider tells you to take them.  Taking over-the-counter medicines, vitamins, herbs, and supplements.    Contact your surgeon if you:  Develop a fever of more than 100.4°F (38°C) or other feelings of illness during the 48 hours before your surgery.  Have symptoms that get worse.  Have questions or concerns about your surgery.  If you are going home the same day of your surgery you will need to arrange for a responsible adult, age 18 years old or older, to drive you home from the hospital and stay with you for 24 hours. Verification of the  will be made prior to any procedure requiring sedation. You may not go home in a taxi or any form of public transportation by yourself.     Day before your procedure      24 hours before your procedure DO NOT drink alcoholic beverages or smoke.  24 hours before your procedure STOP taking Erectile Dysfunction medication (i.e.,Cialis, Viagra)   You may be asked to shower with a germ-killing soap.  Day of your procedure   You may take the following medication(s) the morning of surgery with a sip of water:  pantoprazole      8 hours before your scheduled arrival time, STOP all food, any dairy products, and full liquids. This includes hard candy, chewing gum or mints. This is extremely important to prevent serious complications.     Up to 2 hours before your scheduled arrival time, you may have clear liquids no cream, powder, or pulp of any kind. Safe options are water, black coffee, plain tea, soda, Gatorade/Powerade, clear broth, apple juice.    2 hours  before your scheduled arrival time, STOP drinking clear liquids.    You may need to take another shower with a germ-killing soap before you leave home in the morning. Do not use perfumes, colognes, or body lotions.  Wear comfortable loose-fitting clothing.  Remove all jewelry including body piercing and rings, dark colored nail polish, and make up prior to arrival at the hospital. Leave all valuables at home.   Bring your hearing aids if you rely on them.  Do not wear contact lenses. If you wear eyeglasses remember to bring a case to store them in while you are in surgery.  Do not use denture adhesives since you will be asked to remove them during your surgery.    You do not need to bring your home medications into the hospital.   Bring your sleep apnea device with you on the day of your surgery (if this applies to you).  If you have an Inspire implant for sleep apnea, please bring the remote with you on the day of surgery.  If you wear portable oxygen, bring it with you.   If you are staying overnight, you may bring a bag of items you may need such as slippers, robe and a change of clothes for your discharge. You may want to leave these items in the car until you are ready for them since your family will take your belongings when you leave the pre-operative area.  Arrive at the hospital as scheduled by the office. You will be asked to arrive 2 hours prior to your surgery time in order to prepare for your procedure.  When you arrive at the hospital  Go to the registration desk located at the main entrance of the hospital.  After registration is completed, you will be given a beeper and a sticker sheet. Take the stickers to Outpatient Surgery and place in the tray at the end of the desk to notify the staff that you have arrived and registered.   Return to the lobby to wait. You are not always called back according to the time of arrival but rather the time your doctor will be ready.  When your beeper lights up and  vibrates proceed through the double doors, under the stairs, and a member of the Outpatient Surgery staff will escort you to your preoperative room.

## 2025-05-26 LAB
QT INTERVAL: 410 MS
QTC INTERVAL: 422 MS

## 2025-05-28 ENCOUNTER — TELEPHONE (OUTPATIENT)
Dept: SURGERY | Facility: CLINIC | Age: 75
End: 2025-05-28
Payer: MEDICARE

## 2025-05-28 NOTE — TELEPHONE ENCOUNTER
Called Warren to confirm her surgery date 05/29/25 with an arrival time of 5:30.   Reminded her not to eat or drink after midnight.   Let her know to come through the main entrance of the hospital and check in at main registration.     CBC  05/28    Patient confirmed

## 2025-05-29 ENCOUNTER — ANESTHESIA EVENT (OUTPATIENT)
Dept: PERIOP | Facility: HOSPITAL | Age: 75
End: 2025-05-29
Payer: MEDICARE

## 2025-05-29 ENCOUNTER — ANESTHESIA (OUTPATIENT)
Dept: PERIOP | Facility: HOSPITAL | Age: 75
End: 2025-05-29
Payer: MEDICARE

## 2025-05-29 ENCOUNTER — HOSPITAL ENCOUNTER (OUTPATIENT)
Facility: HOSPITAL | Age: 75
Setting detail: HOSPITAL OUTPATIENT SURGERY
Discharge: HOME | End: 2025-05-29
Attending: STUDENT IN AN ORGANIZED HEALTH CARE EDUCATION/TRAINING PROGRAM | Admitting: STUDENT IN AN ORGANIZED HEALTH CARE EDUCATION/TRAINING PROGRAM
Payer: MEDICARE

## 2025-05-29 ENCOUNTER — HOSPITAL ENCOUNTER (OUTPATIENT)
Dept: GENERAL RADIOLOGY | Facility: HOSPITAL | Age: 75
Discharge: HOME | End: 2025-05-29
Payer: MEDICARE

## 2025-05-29 VITALS
OXYGEN SATURATION: 97 % | HEART RATE: 77 BPM | RESPIRATION RATE: 16 BRPM | SYSTOLIC BLOOD PRESSURE: 163 MMHG | DIASTOLIC BLOOD PRESSURE: 63 MMHG | TEMPERATURE: 97 F

## 2025-05-29 DIAGNOSIS — Z17.0 MALIGNANT NEOPLASM OF LOWER-OUTER QUADRANT OF RIGHT BREAST OF FEMALE, ESTROGEN RECEPTOR POSITIVE: ICD-10-CM

## 2025-05-29 DIAGNOSIS — C50.511 MALIGNANT NEOPLASM OF LOWER-OUTER QUADRANT OF RIGHT BREAST OF FEMALE, ESTROGEN RECEPTOR POSITIVE: ICD-10-CM

## 2025-05-29 PROCEDURE — 25810000003 LACTATED RINGERS PER 1000 ML: Performed by: STUDENT IN AN ORGANIZED HEALTH CARE EDUCATION/TRAINING PROGRAM

## 2025-05-29 PROCEDURE — 25010000002 DEXAMETHASONE PER 1 MG: Performed by: ANESTHESIOLOGY

## 2025-05-29 PROCEDURE — 25010000002 METHYLENE BLUE 50 MG/10ML SOLUTION 10 ML VIAL: Performed by: STUDENT IN AN ORGANIZED HEALTH CARE EDUCATION/TRAINING PROGRAM

## 2025-05-29 PROCEDURE — 25010000002 ENOXAPARIN PER 10 MG: Performed by: STUDENT IN AN ORGANIZED HEALTH CARE EDUCATION/TRAINING PROGRAM

## 2025-05-29 PROCEDURE — 25010000002 GLYCOPYRROLATE 0.4 MG/2ML SOLUTION: Performed by: NURSE ANESTHETIST, CERTIFIED REGISTERED

## 2025-05-29 PROCEDURE — 88307 TISSUE EXAM BY PATHOLOGIST: CPT | Performed by: STUDENT IN AN ORGANIZED HEALTH CARE EDUCATION/TRAINING PROGRAM

## 2025-05-29 PROCEDURE — A9697: HCPCS | Performed by: STUDENT IN AN ORGANIZED HEALTH CARE EDUCATION/TRAINING PROGRAM

## 2025-05-29 PROCEDURE — 38900 IO MAP OF SENT LYMPH NODE: CPT | Performed by: STUDENT IN AN ORGANIZED HEALTH CARE EDUCATION/TRAINING PROGRAM

## 2025-05-29 PROCEDURE — 19301 PARTIAL MASTECTOMY: CPT | Performed by: STUDENT IN AN ORGANIZED HEALTH CARE EDUCATION/TRAINING PROGRAM

## 2025-05-29 PROCEDURE — 25010000002 ONDANSETRON PER 1 MG: Performed by: NURSE ANESTHETIST, CERTIFIED REGISTERED

## 2025-05-29 PROCEDURE — 25010000002 FENTANYL CITRATE (PF) 100 MCG/2ML SOLUTION: Performed by: NURSE ANESTHETIST, CERTIFIED REGISTERED

## 2025-05-29 PROCEDURE — 25010000002 FENTANYL CITRATE (PF) 50 MCG/ML SOLUTION: Performed by: ANESTHESIOLOGY

## 2025-05-29 PROCEDURE — 76098 X-RAY EXAM SURGICAL SPECIMEN: CPT

## 2025-05-29 PROCEDURE — 25010000002 BUPIVACAINE (PF) 0.25 % SOLUTION 30 ML VIAL: Performed by: STUDENT IN AN ORGANIZED HEALTH CARE EDUCATION/TRAINING PROGRAM

## 2025-05-29 PROCEDURE — 25010000002 CEFAZOLIN PER 500 MG: Performed by: STUDENT IN AN ORGANIZED HEALTH CARE EDUCATION/TRAINING PROGRAM

## 2025-05-29 PROCEDURE — 88342 IMHCHEM/IMCYTCHM 1ST ANTB: CPT | Performed by: STUDENT IN AN ORGANIZED HEALTH CARE EDUCATION/TRAINING PROGRAM

## 2025-05-29 PROCEDURE — 38525 BIOPSY/REMOVAL LYMPH NODES: CPT | Performed by: STUDENT IN AN ORGANIZED HEALTH CARE EDUCATION/TRAINING PROGRAM

## 2025-05-29 PROCEDURE — 25010000002 LIDOCAINE 1 % SOLUTION 20 ML VIAL: Performed by: STUDENT IN AN ORGANIZED HEALTH CARE EDUCATION/TRAINING PROGRAM

## 2025-05-29 PROCEDURE — 25010000002 PROPOFOL 10 MG/ML EMULSION: Performed by: NURSE ANESTHETIST, CERTIFIED REGISTERED

## 2025-05-29 DEVICE — LIGACLIP MCA MULTIPLE CLIP APPLIERS, 30 MEDIUM CLIPS
Type: IMPLANTABLE DEVICE | Site: BREAST | Status: FUNCTIONAL
Brand: LIGACLIP

## 2025-05-29 DEVICE — MARKR TISS/BRST MAGTRACE NO/RADITON INJ/LIQ 2ML: Type: IMPLANTABLE DEVICE | Site: BREAST | Status: FUNCTIONAL

## 2025-05-29 DEVICE — HEMOST ABS SURGICEL PWDR 3GM: Type: IMPLANTABLE DEVICE | Site: BREAST | Status: FUNCTIONAL

## 2025-05-29 RX ORDER — OXYCODONE HYDROCHLORIDE 5 MG/1
5 TABLET ORAL EVERY 8 HOURS PRN
Qty: 10 TABLET | Refills: 0 | Status: SHIPPED | OUTPATIENT
Start: 2025-05-29 | End: 2026-05-29

## 2025-05-29 RX ORDER — PROPOFOL 10 MG/ML
VIAL (ML) INTRAVENOUS AS NEEDED
Status: DISCONTINUED | OUTPATIENT
Start: 2025-05-29 | End: 2025-05-29 | Stop reason: SURG

## 2025-05-29 RX ORDER — GLYCOPYRROLATE 0.2 MG/ML
INJECTION INTRAMUSCULAR; INTRAVENOUS AS NEEDED
Status: DISCONTINUED | OUTPATIENT
Start: 2025-05-29 | End: 2025-05-29 | Stop reason: SURG

## 2025-05-29 RX ORDER — ONDANSETRON 2 MG/ML
4 INJECTION INTRAMUSCULAR; INTRAVENOUS ONCE AS NEEDED
Status: DISCONTINUED | OUTPATIENT
Start: 2025-05-29 | End: 2025-05-29 | Stop reason: HOSPADM

## 2025-05-29 RX ORDER — FENTANYL CITRATE 50 UG/ML
50 INJECTION, SOLUTION INTRAMUSCULAR; INTRAVENOUS
Status: DISCONTINUED | OUTPATIENT
Start: 2025-05-29 | End: 2025-05-29 | Stop reason: HOSPADM

## 2025-05-29 RX ORDER — SODIUM CHLORIDE 9 MG/ML
40 INJECTION, SOLUTION INTRAVENOUS AS NEEDED
Status: DISCONTINUED | OUTPATIENT
Start: 2025-05-29 | End: 2025-05-29 | Stop reason: HOSPADM

## 2025-05-29 RX ORDER — SODIUM CHLORIDE 0.9 % (FLUSH) 0.9 %
3 SYRINGE (ML) INJECTION AS NEEDED
Status: DISCONTINUED | OUTPATIENT
Start: 2025-05-29 | End: 2025-05-29 | Stop reason: HOSPADM

## 2025-05-29 RX ORDER — SODIUM CHLORIDE 0.9 % (FLUSH) 0.9 %
3 SYRINGE (ML) INJECTION EVERY 12 HOURS SCHEDULED
Status: DISCONTINUED | OUTPATIENT
Start: 2025-05-29 | End: 2025-05-29 | Stop reason: HOSPADM

## 2025-05-29 RX ORDER — IBUPROFEN 600 MG/1
600 TABLET, FILM COATED ORAL EVERY 6 HOURS PRN
Status: DISCONTINUED | OUTPATIENT
Start: 2025-05-29 | End: 2025-05-29 | Stop reason: HOSPADM

## 2025-05-29 RX ORDER — ONDANSETRON 2 MG/ML
INJECTION INTRAMUSCULAR; INTRAVENOUS AS NEEDED
Status: DISCONTINUED | OUTPATIENT
Start: 2025-05-29 | End: 2025-05-29 | Stop reason: SURG

## 2025-05-29 RX ORDER — FLUMAZENIL 0.1 MG/ML
0.2 INJECTION INTRAVENOUS AS NEEDED
Status: DISCONTINUED | OUTPATIENT
Start: 2025-05-29 | End: 2025-05-29 | Stop reason: HOSPADM

## 2025-05-29 RX ORDER — HYDROCODONE BITARTRATE AND ACETAMINOPHEN 5; 325 MG/1; MG/1
1 TABLET ORAL EVERY 4 HOURS PRN
Status: DISCONTINUED | OUTPATIENT
Start: 2025-05-29 | End: 2025-05-29 | Stop reason: HOSPADM

## 2025-05-29 RX ORDER — LIDOCAINE HYDROCHLORIDE 10 MG/ML
0.5 INJECTION, SOLUTION EPIDURAL; INFILTRATION; INTRACAUDAL; PERINEURAL ONCE AS NEEDED
Status: DISCONTINUED | OUTPATIENT
Start: 2025-05-29 | End: 2025-05-29 | Stop reason: HOSPADM

## 2025-05-29 RX ORDER — SODIUM CHLORIDE, SODIUM LACTATE, POTASSIUM CHLORIDE, CALCIUM CHLORIDE 600; 310; 30; 20 MG/100ML; MG/100ML; MG/100ML; MG/100ML
1000 INJECTION, SOLUTION INTRAVENOUS CONTINUOUS
Status: DISCONTINUED | OUTPATIENT
Start: 2025-05-29 | End: 2025-05-29 | Stop reason: HOSPADM

## 2025-05-29 RX ORDER — ACETAMINOPHEN 325 MG/1
975 TABLET ORAL EVERY 8 HOURS
Start: 2025-05-29 | End: 2026-05-29

## 2025-05-29 RX ORDER — ENOXAPARIN SODIUM 100 MG/ML
40 INJECTION SUBCUTANEOUS DAILY
Status: DISCONTINUED | OUTPATIENT
Start: 2025-05-29 | End: 2025-05-29 | Stop reason: HOSPADM

## 2025-05-29 RX ORDER — SODIUM CHLORIDE, SODIUM LACTATE, POTASSIUM CHLORIDE, CALCIUM CHLORIDE 600; 310; 30; 20 MG/100ML; MG/100ML; MG/100ML; MG/100ML
100 INJECTION, SOLUTION INTRAVENOUS CONTINUOUS
Status: DISCONTINUED | OUTPATIENT
Start: 2025-05-29 | End: 2025-05-29 | Stop reason: HOSPADM

## 2025-05-29 RX ORDER — HYDROCODONE BITARTRATE AND ACETAMINOPHEN 10; 325 MG/1; MG/1
1 TABLET ORAL EVERY 4 HOURS PRN
Status: DISCONTINUED | OUTPATIENT
Start: 2025-05-29 | End: 2025-05-29 | Stop reason: HOSPADM

## 2025-05-29 RX ORDER — ONDANSETRON 4 MG/1
4 TABLET, FILM COATED ORAL EVERY 8 HOURS PRN
Qty: 15 TABLET | Refills: 0 | Status: SHIPPED | OUTPATIENT
Start: 2025-05-29 | End: 2026-05-29

## 2025-05-29 RX ORDER — NALOXONE HCL 0.4 MG/ML
0.4 VIAL (ML) INJECTION AS NEEDED
Status: DISCONTINUED | OUTPATIENT
Start: 2025-05-29 | End: 2025-05-29 | Stop reason: HOSPADM

## 2025-05-29 RX ORDER — ACETAMINOPHEN 500 MG
1000 TABLET ORAL ONCE
Status: COMPLETED | OUTPATIENT
Start: 2025-05-29 | End: 2025-05-29

## 2025-05-29 RX ORDER — DEXAMETHASONE SODIUM PHOSPHATE 4 MG/ML
4 INJECTION, SOLUTION INTRA-ARTICULAR; INTRALESIONAL; INTRAMUSCULAR; INTRAVENOUS; SOFT TISSUE ONCE AS NEEDED
Status: COMPLETED | OUTPATIENT
Start: 2025-05-29 | End: 2025-05-29

## 2025-05-29 RX ORDER — ROCURONIUM BROMIDE 10 MG/ML
INJECTION, SOLUTION INTRAVENOUS AS NEEDED
Status: DISCONTINUED | OUTPATIENT
Start: 2025-05-29 | End: 2025-05-29 | Stop reason: SURG

## 2025-05-29 RX ORDER — IBUPROFEN 200 MG
600 TABLET ORAL EVERY 8 HOURS
Start: 2025-05-29 | End: 2026-05-29

## 2025-05-29 RX ORDER — FENTANYL CITRATE 50 UG/ML
INJECTION, SOLUTION INTRAMUSCULAR; INTRAVENOUS AS NEEDED
Status: DISCONTINUED | OUTPATIENT
Start: 2025-05-29 | End: 2025-05-29 | Stop reason: SURG

## 2025-05-29 RX ORDER — SODIUM CHLORIDE 0.9 % (FLUSH) 0.9 %
3-10 SYRINGE (ML) INJECTION AS NEEDED
Status: DISCONTINUED | OUTPATIENT
Start: 2025-05-29 | End: 2025-05-29 | Stop reason: HOSPADM

## 2025-05-29 RX ORDER — NEOSTIGMINE METHYLSULFATE 5 MG/5 ML
SYRINGE (ML) INTRAVENOUS AS NEEDED
Status: DISCONTINUED | OUTPATIENT
Start: 2025-05-29 | End: 2025-05-29 | Stop reason: SURG

## 2025-05-29 RX ORDER — LABETALOL HYDROCHLORIDE 5 MG/ML
5 INJECTION, SOLUTION INTRAVENOUS
Status: DISCONTINUED | OUTPATIENT
Start: 2025-05-29 | End: 2025-05-29 | Stop reason: HOSPADM

## 2025-05-29 RX ADMIN — ONDANSETRON 4 MG: 2 INJECTION INTRAMUSCULAR; INTRAVENOUS at 08:30

## 2025-05-29 RX ADMIN — HYDROCODONE BITARTRATE AND ACETAMINOPHEN 1 TABLET: 10; 325 TABLET ORAL at 09:05

## 2025-05-29 RX ADMIN — ACETAMINOPHEN 1000 MG: 500 TABLET, FILM COATED ORAL at 06:59

## 2025-05-29 RX ADMIN — Medication 3 MG: at 08:32

## 2025-05-29 RX ADMIN — ROCURONIUM BROMIDE 30 MG: 10 INJECTION, SOLUTION INTRAVENOUS at 07:47

## 2025-05-29 RX ADMIN — FENTANYL CITRATE 50 MCG: 50 INJECTION, SOLUTION INTRAMUSCULAR; INTRAVENOUS at 09:10

## 2025-05-29 RX ADMIN — ENOXAPARIN SODIUM 40 MG: 100 INJECTION SUBCUTANEOUS at 06:59

## 2025-05-29 RX ADMIN — SODIUM CHLORIDE, POTASSIUM CHLORIDE, SODIUM LACTATE AND CALCIUM CHLORIDE 1000 ML: 600; 310; 30; 20 INJECTION, SOLUTION INTRAVENOUS at 06:12

## 2025-05-29 RX ADMIN — GLYCOPYRROLATE 0.4 MG: 0.2 INJECTION INTRAMUSCULAR; INTRAVENOUS at 08:32

## 2025-05-29 RX ADMIN — CEFAZOLIN 2000 MG: 2 INJECTION, POWDER, FOR SOLUTION INTRAMUSCULAR; INTRAVENOUS at 07:54

## 2025-05-29 RX ADMIN — DEXAMETHASONE SODIUM PHOSPHATE 4 MG: 4 INJECTION, SOLUTION INTRA-ARTICULAR; INTRALESIONAL; INTRAMUSCULAR; INTRAVENOUS; SOFT TISSUE at 06:59

## 2025-05-29 RX ADMIN — SODIUM CHLORIDE, POTASSIUM CHLORIDE, SODIUM LACTATE AND CALCIUM CHLORIDE: 600; 310; 30; 20 INJECTION, SOLUTION INTRAVENOUS at 08:32

## 2025-05-29 RX ADMIN — PROPOFOL 150 MG: 10 INJECTION, EMULSION INTRAVENOUS at 07:47

## 2025-05-29 RX ADMIN — FENTANYL CITRATE 100 MCG: 50 INJECTION, SOLUTION INTRAMUSCULAR; INTRAVENOUS at 07:44

## 2025-05-29 NOTE — ANESTHESIA PREPROCEDURE EVALUATION
Anesthesia Evaluation     Patient summary reviewed   no history of anesthetic complications:   NPO Solid Status: > 8 hours  NPO Liquid Status: > 8 hours           Airway   Mallampati: II  TM distance: >3 FB  Neck ROM: full  No difficulty expected  Dental - normal exam         Pulmonary - negative pulmonary ROS   (-) asthma, sleep apnea, not a smoker  Cardiovascular   Exercise tolerance: good (4-7 METS)    ECG reviewed    (+) hyperlipidemia  (-) hypertension, past MI, dysrhythmias, cardiac stents      Neuro/Psych  (-) seizures, TIA, CVA  GI/Hepatic/Renal/Endo    (+) GERD, renal disease- stones, thyroid problem   (-) liver disease, diabetes    Musculoskeletal     Abdominal    Substance History      OB/GYN          Other                    Anesthesia Plan    ASA 2     general     intravenous induction     Anesthetic plan, risks, benefits, and alternatives have been provided, discussed and informed consent has been obtained with: patient.    CODE STATUS:          Creatinine elevated on admission, 2 61  Baseline appears 2 0-2 2 over last year, may be related to either dehydration given reports of diarrhea vs CHF exacerbation as above    · Received Lasix 20mg IV x1, monitoring I&Os  · Holding nephrotoxic medications   · Repeat AM labs after diuresis, reassess volume status

## 2025-05-29 NOTE — OP NOTE
Right Lumpectomy with Right Axillary [place] Lymph Node biopsy Operative Report:     Patient: Warren Quinn  MRN: [id number]    YOB: 1950  Age: 74 y.o.  Sex: female  Unit:  PAD OR Room/Bed: PAD OR/MAIN OR Location: [facility]      Admitting Physician: DEISI VICKERS    Primary Care Physician: Claudia Keane DO             INDICATIONS: This is a 74 y.o. female who presents with right breast invasive ductal carcinoma. After a discussion of risks and benefits and alternative surgical approaches, the patient was consented for right lumpectomy with sentinel lymph node biopsy.     DATE OF OPERATION: 5/29/2025     Surgeons and Role:     * Deisi Vickers MD - Primary  Isha Perez PA-C - assist     ANESTHESIA: General     PREOPERATIVE DIAGNOSIS: Malignant neoplasm of lower-outer quadrant of right breast of female, estrogen receptor positive [C50.511, Z17.0]    POSTOPERATIVE DIAGNOSIS: Same    PROCEDURES PERFORMED:    (1) 93345 Injection of Magtrace and Methylene Blue for mapping of axillary sentinel lymph node   (2) Right Mag seed Guided Lumpectomy   (2) Right Axillary [place] Lymph Node Biopsy   (4) Modifier 58 - additional surgery may be required to address surgical pathology or complications     PROCEDURE DETAILS:    After informed consent was obtained, the patient was brought to the operating room and the site of surgery was confirmed. General anesthesia was induced.  Then 2 mL of Methylene blue diluted in 3 mL injectable saline as well as Magtrace were injected periareolar on the right breast. It was then massaged. The right chest and right axilla were prepped with ChloraPrep and draped in sterile fashion. A time out was completed verifying the correct patient, procedure, site, positioning and special equipment needed prior to incision.   The Mag-Probe was used to localize the excision target with the preoperatively placed Magseed on the right breast at 6:00. Local  anesthetic was infiltrated. An incision was made over the seed and lesion. Skin flaps were raised circumferentially. I dissected down to the lesion with cautery, ensuring to use the Mag-Probe to guide me. When I was close to the lesion based on counts, a plastic Lilibeth was placed on the tissue and the lesion and a rim of normal tissue was dissected free with cautery. It was marked with a short stitch superior, double long lateral and single long anterior. It was placed in the trident for mammography on a grid then sent for permanent pathology. Based on the mammogram, I elected to take an additional inferior, medial and posterior margin. The specimen was taken to dermis so there was no anterior margin to take. These were marked with a stitch on the new margin. The mammogram noted the seed, clip and lesion in the original specimen. Hemostasis was obtained in the cavity. The cavity was irrigated. Clips were placed for post-op guidance. The incision was closed with interrupted 3-0 Vicryl dermal sutures followed by a running 4-0 Monocryl subcuticular.   I then turned my attention to the right axillary sentinel lymph node biopsy. The Mag-Probe was used to localize the node. Local anesthetic was infiltrated. An incision was made over the node. Dissection was carried down to the node using the Mag-Probe as guidance. The sentinel node was noted to be hot. It was removed with cautery, ensuring to clip the vascular and lymphatic pedicles. It was in the deep plane just superior to the muscle. There were no nodes that were at least 10% of the sentinel node, no palpable nodes, and no blue nodes. The cavity was irrigated and hemostasis was confirmed. The incision was closed with interrupted 3-0 Vicryl dermal sutures followed by a running 4-0 Monocryl subcuticular.   Both incisions were dressed with skin glue. The breast incision was then dressed with Telfa and Tegaderm and covered with a surgical bra. The patient tolerated the  procedure well and was transferred to PACU in good condition.     Findings: seed, clip and lesion in specimen; one hot sentinel node      Estimated Blood Loss:  25 mL   Complications: none apparent   Specimens:   (1) Right lumpectomy - double short superior, double long right lateral, single long anterior   (2) Medial margin - stitch marks new margin   (3) Inferior margin - stitch marks new margin   (4) Posterior margin - stitch marks new margin   (5) Right axillary sentinel node      [place] Node Biopsy for Breast Cancer - Right  Operation performed with curative intent. Yes   Tracer(s) used to identify sentinel nodes in the upfront surgery (non-neoadjuvant) setting (select all that apply). Dye and Superparamagnetic iron oxide   Tracer(s) used to identify sentinel nodes in the neoadjuvant setting (select all that apply). N/A   All nodes (colored or non-colored) present at the end of a dye-filled lymphatic channel were removed. Yes   All significantly radioactive nodes were removed. Yes   All palpably suspicious nodes were removed. Yes   Biopsy-proven positive nodes marked with clips prior to chemotherapy were identified and removed. N/A     Ihsa Perez PA-C was responsible for performing the following activities: Retraction, Suturing, Closing, and Placing Dressing and their skilled assistance was necessary for the success of this case.    Disposition: PACU - hemodynamically stable.           Condition: stable    Madeline Sharma MD  05/05/2025

## 2025-05-29 NOTE — DISCHARGE INSTRUCTIONS
Wound:   - you have skin glue on your incisions. Okay to shower tomorrow. Leave dressing in place until follow up.   - Leave skin glue in place, it should slowly fall off over 2 weeks   - No swimming/soaking/bathing x 2 weeks to allow incisions to heal.     Activity:   - Activity as tolerated. NO heavy lifting x 2 weeks - no more than 25lb at a time with the right arm   - No driving or operating machinery on narcotic pain medication.     Pain medication:   - Take 1000mg of tylenol every 8 hours for 3 days. After three days, take it prn.   - Take 600mg of ibuprofen (motrin) every 8 hours for 3 days. After three days, take it prn.   - You have a prescription for a narcotic. It will be roxicodone 5mg tabs. Take these only as needed after you have taken the tylenol and ibuprofen. If you are taking the roxicodone, make sure to take a stool softener (colace) with it as it can cause constipation.   - The narcotic may make you nauseated, you will have a prescription for zofran in case of nausea.     Follow up:   - make an appointment to see me in 1.5 weeks  - If you have any concerns before then, call me office at [phone]

## 2025-05-29 NOTE — ANESTHESIA POSTPROCEDURE EVALUATION
Patient: Warren Quinn    Procedure Summary       Date: 05/29/25 Room / Location: [facility] OR  /  [facility] OR    Anesthesia Start: 0744 Anesthesia Stop: 0847    Procedure: RIGHT BREAST LUMPECTOMY WITH MAG SEED GUIDANCE AND MAGTRACE GUIDED SENTINEL LYMPH NODE BIOPSY (Right: Breast) Diagnosis:       Malignant neoplasm of lower-outer quadrant of right breast of female, estrogen receptor positive      (Malignant neoplasm of lower-outer quadrant of right breast of female, estrogen receptor positive [C50.511, Z17.0])    Surgeons: Madeline Sharma MD Provider: El Horton CRNA    Anesthesia Type: general ASA Status: 2            Anesthesia Type: general    Vitals  Vitals Value Taken Time   /66 05/29/25 09:54   Temp 97 °F (36.1 °C) 05/29/25 09:30   Pulse 63 05/29/25 09:54   Resp 14 05/29/25 09:54   SpO2 94 % 05/29/25 09:54           Post Anesthesia Care and Evaluation    Patient location during evaluation: PACU  Patient participation: complete - patient participated  Level of consciousness: awake and alert  Pain management: adequate    Airway patency: patent  Anesthetic complications: No anesthetic complications  PONV Status: none  Cardiovascular status: acceptable and hemodynamically stable  Respiratory status: acceptable  Hydration status: acceptable    Comments: Blood pressure 165/83, pulse 67, temperature 97 °F (36.1 °C), temperature source Temporal, resp. rate 16, SpO2 92%, not currently breastfeeding.    Patient discharged from PACU based upon Sandy score. Please see RN notes for further details

## 2025-05-29 NOTE — ANESTHESIA PROCEDURE NOTES
Airway  Reason: elective    Date/Time: 5/29/2025 7:50 AM  Airway not difficult    General Information and Staff    Patient location during procedure: OR  CRNA/CAA: El Horton CRNA    Indications and Patient Condition  Indications for airway management: airway protection    Preoxygenated: yes  MILS maintained throughout    Mask difficulty assessment: 1 - vent by mask    Final Airway Details    Final airway type: endotracheal airway      Successful airway: ETT  Cuffed: yes   Successful intubation technique: direct laryngoscopy  Endotracheal tube insertion site: oral  Blade: Traore  Blade size: 2  ETT size (mm): 7.0  Cormack-Lehane Classification: grade IIb - view of arytenoids or posterior of glottis only  Placement verified by: chest auscultation and capnometry   Cuff volume (mL): 5  Measured from: gums  ETT/EBT to gums (cm): 20  Number of attempts at approach: 1  Assessment: lips, teeth, and gum same as pre-op and atraumatic intubation

## 2025-05-30 ENCOUNTER — TELEPHONE (OUTPATIENT)
Dept: SURGERY | Facility: CLINIC | Age: 75
End: 2025-05-30
Payer: MEDICARE

## 2025-05-30 NOTE — TELEPHONE ENCOUNTER
Post OP phone call visit:    Type of surgery: RIGHT BREAST LUMPECTOMY WITH MAG SEED GUIDANCE AND MAGTRACE GUIDED SENTINEL LYMPH NODE BIOPSY.      Unable to reach patient. Left a detailed message with call back phone number for any questions or concerns.

## 2025-05-31 NOTE — PROGRESS NOTES
[facility] HEMATOLOGY & ONCOLOGY  [address]  [place] [address] [phone]    Patient Name: Warren Quinn  Encounter Date: 06/04/2025  YOB: 1950  Patient Number: [id number]      REASON FOR VISIT:  Warren Quinn is a 74 yoF who returns in follow-up of mammary carcinoma, no special type (ductal)-- Original tumor stage: AJCC-at least IA ([id number]) ER 90%+, NE 91%+, HER2 2+ equivocal/low-FISH negative. Underwent right lumpectomy with right SLNB, 5/29/25.  Path pending she has been on adjuvant anastrozole since 4/30/25.  She is here with her daughter Joi.    I have reviewed the HPI and verified with the patient the accuracy of it. No changes to interval history since the information was documented. Woody Hi MD 06/04/25       Diagnostic abnormalities:  -  History of hypothyroidism, GERD, hyperlipidemia, kidney stones, osteopenia, arthritis, chronic postprandial diarrhea/IBS, history of left breast cancer    -3/21/2025- Mammogram-right breast asymmetry at 7 AM in the right breast measuring about 1 cm in size.  Right CC focal compression, right MLO focal compression and right true lateral 2D and 3D sequences recommended.  Ultrasound if persistent.  Stable left mammogram.    -4/9/2025- Right breast, mass at 7:00 (image-guided core biopsies):       -Invasive adenocarcinoma, consistent with mammary carcinoma, no special type (ductal).       -Intermediate combined histologic grade (G2).       -Carcinoma involves 3 of 3 cores (at least 9 mm). ER 90%+, NE 91%+, HER2 2+ equivocal/low-FISH negative    -4/30/25- CMP normal. CEA 2.23. CA27-29 18.3. CBC normal  -5/5/25- MRI breast bilateral-1. RIGHT breast 1.2 cm mass, consistent with biopsy-proven malignancy.2. No additional suspicious findings in the RIGHT or LEFT breast.Posttreatment changes to the LEFT breast and axilla.3. No internal mammary or axillary adenopathy.4.  Gallstone and hiatal hernia. BI-RADS CATEGORY 6: Known biopsy-proven malignancy  Management Recommendation: Surgical excision when clinically appropriate.  -5/6/25- CT CAP-No CT evidence for metastatic disease to the chest. Small right middle lobe pulmonary nodules are likely benign but.continued attention on follow-up is recommended.No acute findings in the abdomen or pelvis to suggest neoplastic process or metastatic disease.    Previous interventions:  - 4/30/2025- Patient is seen for management considerations.  - Adjuvant anastrozole since 4/30/25.  - 5/29/2025- Right lumpectomy with right SLNB.        LABS    Lab Results - Last 18 Months   Lab Units 05/22/25  0852 05/06/25  1330 04/30/25  0912 05/29/24  1343   GLUCOSE mg/dL 110*  --  110*  --    SODIUM mmol/L 142  --  142  --    POTASSIUM mmol/L 4.1  --  4.5  --    CO2 mmol/L 25.0  --  27.0  --    CHLORIDE mmol/L 105  --  104  --    ANION GAP mmol/L 12.0  --  11.0  --    CREATININE mg/dL 0.84 1.10 0.89 0.90   BUN mg/dL 15  --  19  --    BUN / CREAT RATIO  17.9  --  21.3  --    CALCIUM mg/dL 9.5  --  9.7  --    ALK PHOS U/L 67  --  69  --    TOTAL PROTEIN g/dL 7.0  --  7.3  --    ALT (SGPT) U/L 17  --  16  --    AST (SGOT) U/L 21  --  20  --    BILIRUBIN mg/dL 0.4  --  0.3  --    ALBUMIN g/dL 4.1  --  4.3  --    GLOBULIN gm/dL 2.9  --  3.0  --          PAST MEDICAL HISTORY:  ALLERGIES:  No Known Allergies  CURRENT MEDICATIONS:  Outpatient Encounter Medications as of 6/4/2025   Medication Sig Dispense Refill    acetaminophen (Tylenol) 325 MG tablet Take 3 tablets by mouth Every 8 (Eight) Hours. Take every 8 hours for 3 days then take prn as needed.      anastrozole (ARIMIDEX) 1 MG tablet Take 1 tablet by mouth Daily. 30 tablet 11    Calcium Carbonate-Vitamin D 600-10 MG-MCG per tablet Take 1 tablet by mouth 2 (Two) Times a Day. 60 tablet 11    cholecalciferol (VITAMIN D3) 1000 units tablet Take 1 tablet by mouth Daily.      ibuprofen (Motrin IB) 200 MG tablet  Take 3 tablets by mouth Every 8 (Eight) Hours. Take every 8 hours for three days then take as needed.      levothyroxine (SYNTHROID, LEVOTHROID) 100 MCG tablet Take 1 tablet by mouth Daily.      naproxen (NAPROSYN) 250 MG tablet Take 1 tablet by mouth As Needed (rare).      ondansetron (Zofran) 4 MG tablet Take 1 tablet by mouth Every 8 (Eight) Hours As Needed for Nausea or Vomiting. 15 tablet 0    oxyCODONE (Roxicodone) 5 MG immediate release tablet Take 1 tablet by mouth Every 8 (Eight) Hours As Needed for Severe Pain. 10 tablet 0    pantoprazole (PROTONIX) 40 MG EC tablet Take 1 tablet by mouth 2 (Two) Times a Day.      simvastatin (ZOCOR) 40 MG tablet Take 1 tablet by mouth Every Night.       Facility-Administered Encounter Medications as of 6/4/2025   Medication Dose Route Frequency Provider Last Rate Last Admin    lidocaine (XYLOCAINE) 1 % injection 10 mL  10 mL Subcutaneous Once Darvin Jose MD        lidocaine (XYLOCAINE) 1 % injection 10 mL  10 mL Subcutaneous Once Steven Jimenez MD        lidocaine 1% - EPINEPHrine 1:542260 (XYLOCAINE W/EPI) 1 %-1:106017 injection 10 mL  10 mL Injection Once Darvin Jose MD         ADULT ILLNESSES:  Patient Active Problem List   Diagnosis Code    Gastroesophageal reflux disease K21.9    Family hx colonic polyps Z83.719    Hx of colonic polyp Z86.0100    Ductal carcinoma of right breast C50.911    Malignant neoplasm of lower-outer quadrant of right breast of female, estrogen receptor positive C50.511, Z17.0     SURGERIES:  Past Surgical History:   Procedure Laterality Date    ANKLE SURGERY      BREAST AUGMENTATION      BREAST LUMPECTOMY      BREAST LUMPECTOMY WITH SENTINEL NODE BIOPSY Right 5/29/2025    Procedure: RIGHT BREAST LUMPECTOMY WITH MAG SEED GUIDANCE AND MAGTRACE GUIDED SENTINEL LYMPH NODE BIOPSY;  Surgeon: Madeline Sharma MD;  Location: [facility] OR;  Service: General;  Laterality: Right;    BREAST RECONSTRUCTION      CARPAL TUNNEL RELEASE       CATARACT EXTRACTION      COLONOSCOPY  12/05/2014    COLONOSCOPY N/A 01/20/2020    Procedure: COLONOSCOPY WITH ANESTHESIA;  Surgeon: Elmer Crawley MD;  Location: [facility] ENDOSCOPY;  Service: Gastroenterology    ENDOSCOPY N/A 05/01/2018    Procedure: ESOPHAGOGASTRODUODENOSCOPY WITH ANESTHESIA;  Surgeon: Elmer Crawley MD;  Location: [facility] ENDOSCOPY;  Service: Gastroenterology    HAND SURGERY      3rd finger partial amputation    KIDNEY STONE SURGERY      TONSILLECTOMY      TOTAL HIP ARTHROPLASTY Right     TUBAL ABDOMINAL LIGATION       HEALTH MAINTENANCE ITEMS:  Health Maintenance Due   Topic Date Due    DXA SCAN  Never done    DIABETIC FOOT EXAM  Never done    DIABETIC EYE EXAM  Never done    URINE MICROALBUMIN-CREATININE RATIO (uACR)  Never done    Pneumococcal Vaccine 50+ (1 of 2 - PCV) Never done    HEPATITIS C SCREENING  Never done    HEMOGLOBIN A1C  Never done    RSV Vaccine - Adults (1 - 1-dose 75+ series) Never done    COVID-19 Vaccine (5 - 2024-25 season) 09/01/2024    COLORECTAL CANCER SCREENING  01/20/2025    ANNUAL WELLNESS VISIT  06/25/2025       <no information>  Last Completed Colonoscopy    This patient has no relevant Health Maintenance data.       Immunization History   Administered Date(s) Administered    COVID-19 (MODERNA) 1st,2nd,3rd Dose Monovalent 01/19/2021, 02/22/2021    COVID-19 (MODERNA) Monovalent Original Booster 11/15/2021, 07/18/2022     Last Completed Mammogram            Completed or No Longer Recommended       MAMMOGRAM  Discontinued        Frequency changed to Never automatically (Topic No Longer Applies)    04/01/2025  MAMMO Scan    10/30/2018  Outside Claim: G SCREENING DIGITAL BREAST TOMOSYNTHESIS BI    10/30/2018  Outside Claim: HC MAMMOGRAM SCREENING BILAT DIGITAL W CAD    03/27/2017  Outside Claim: [place] TOMOSYNTHESIS MAMMOGRAPHY     Only the first 5 history entries have been loaded, but more history exists.                            FAMILY HISTORY:  Family History  "  Problem Relation Age of Onset    No Known Problems Mother     Colon polyps Father     Heart disease Father     Heart disease Brother     Colon cancer Neg Hx      SOCIAL HISTORY:  Social History     Socioeconomic History    Marital status: Single   Tobacco Use    Smoking status: Former    Smokeless tobacco: Never   Substance and Sexual Activity    Alcohol use: Not Currently    Drug use: No    Sexual activity: Defer       REVIEW OF SYSTEMS:  Review of Systems   Constitutional:  Negative for activity change and fatigue.        Manages her ADLs to include chores, errands and driving.  Is up and about, \"all the time.\"    Anastrozole tolerance:  Mild hot flashes.  No new arthralgias,  Taking daily   HENT: Negative.     Eyes: Negative.    Respiratory: Negative.     Cardiovascular: Negative.    Gastrointestinal:  Positive for diarrhea (Intermittent.  IBS).   Endocrine: Negative.         G2,     Menarche age 11    Menopause 1998   Genitourinary: Negative.    Musculoskeletal:  Positive for arthralgias (Chronic).   Skin: Negative.    Allergic/Immunologic: Negative.    Neurological:  Positive for tremors (Essential).   Hematological: Negative.    Psychiatric/Behavioral: Negative.         /74   Pulse 76   Temp 98.3 °F (36.8 °C) (Temporal)   Wt 76.2 kg (168 lb)   SpO2 99%   BMI 30.92 kg/m²  Body surface area is 1.77 meters squared.  Pain Score    25 1308   PainSc: 0-No pain       Physical Exam  Vitals and nursing note reviewed. Exam conducted with a chaperone present.   Constitutional:       Comments: Pleasant, cooperative, heavyset, modestly kept elderly female.  Ambulatory.  ECOG 0.    Has lost 2 lb since her initial visit     HENT:      Head: Normocephalic and atraumatic.   Eyes:      General: No scleral icterus.     Extraocular Movements: Extraocular movements intact.      Pupils: Pupils are equal, round, and reactive to light.   Cardiovascular:      Rate and Rhythm: Normal rate.   Pulmonary:      " Effort: Pulmonary effort is normal.   Chest:          Comments: Chaperoned exam: Cristy Hart MA    Right breast is notable for clean dressing underlying the lumpectomy and SLNB sites which are surrounded by postop ecchymosis and swelling.    Left breast is notable for previous lumpectomy incision which is well-healed and radiation associated skin thickening/pigmentation.    No associated axillary/supraclavicular adenopathy bilaterally..  Abdominal:      Palpations: Abdomen is soft.   Musculoskeletal:         General: Normal range of motion.      Cervical back: Normal range of motion.   Lymphadenopathy:      Upper Body:      Right upper body: No supraclavicular or axillary adenopathy.      Left upper body: No supraclavicular or axillary adenopathy.   Skin:     General: Skin is warm.   Neurological:      General: No focal deficit present.      Mental Status: She is alert and oriented to person, place, and time.   Psychiatric:         Mood and Affect: Mood normal.         Behavior: Behavior normal.         Thought Content: Thought content normal.         Assessment:  Mammary carcinoma, no special type (ductal)  -- Original tumor stage: AJCC-at least IA ([id number]) ER 90%+, OK 91%+, HER2 2+ equivocal/low-FISH p.  --Original tumor burden:  -3/21/2025- Mammogram-right breast asymmetry at 7 AM in the right breast measuring about 1 cm in size.  Right CC focal compression, right MLO focal compression and right true lateral 2D and 3D sequences recommended.  Ultrasound if persistent.  Stable left mammogram.    -4/9/2025- Right breast, mass at 7:00 (image-guided core biopsies):       -Invasive adenocarcinoma, consistent with mammary carcinoma, no special type (ductal).       -Intermediate combined histologic grade (G2).       -Carcinoma involves 3 of 3 cores (at least 9 mm). ER 90%+, OK 91%+, HER2 2+ equivocal/low-FISH negative    -4/30/25- CMP normal. CEA 2.23. CA27-29 18.3. CBC normal  -5/5/25- MRI breast bilateral-1.  RIGHT breast 1.2 cm mass, consistent with biopsy-proven malignancy.2. No additional suspicious findings in the RIGHT or LEFT breast.Posttreatment changes to the LEFT breast and axilla.3. No internal mammary or axillary adenopathy.4. Gallstone and hiatal hernia. BI-RADS CATEGORY 6: Known biopsy-proven malignancy  Management Recommendation: Surgical excision when clinically appropriate.  -5/6/25- CT CAP-No CT evidence for metastatic disease to the chest. Small right middle lobe pulmonary nodules are likely benign but.continued attention on follow-up is recommended.No acute findings in the abdomen or pelvis to suggest neoplastic process or metastatic disease.  --Tumor status:  -5/29/2025- Right lumpectomy with right SLNB.  Path pending    2.  History of left breast cancer.  No details  3.  IBS  4.  Osteopenia  5.  Hypothyroidism    Plan:  Review available diagnostic information as outlined above.  -4/30/25- CMP normal. CEA 2.23. CA27-29 18.3. CBC normal  -5/5/25- MRI breast bilateral-1. RIGHT breast 1.2 cm mass, consistent with biopsy-proven malignancy.2. No additional suspicious findings in the RIGHT or LEFT breast.  -5/6/25- CT CAP-No CT evidence for metastatic disease to the chest, abdomen or pelvis to suggest neoplastic process or metastatic disease.  -5/22/25- CBC and CMP normal    2.   DEXA scan-need report from [facility] done last 5/2025  3.   Send biopsy right breast mass for Oncotype DX recurrence score  4.   Await path from surgery 5/29/25  5.   Discussed the rationale and potential toxicities of aromatase inhibitors (increased risk for fractures, bone/joint pain, etc.) discussed at length.  Questions answered.  She is tolerating well and agrees to press on with therapy.    6.   Rx:   Arimidex 1 mg p.o. daily dispense 30 x 11 RF                Os-Augustin 600/400 p.o. twice daily dispense 60 x 11 RF    7.   Refer to radiation oncology  8.   Return to office in 6 weeks with CMP, CBC/diff        I spent ~42  minutes caring for Warren on this date of service. This time includes time spent by me in the following activities: preparing for the visit, reviewing tests, performing a medically appropriate examination and/or evaluation, counseling and educating the patient/family/caregiver, ordering medications, tests, or procedures and documenting information in the medical record.

## 2025-06-02 ENCOUNTER — TELEPHONE (OUTPATIENT)
Dept: ONCOLOGY | Facility: CLINIC | Age: 75
End: 2025-06-02
Payer: MEDICARE

## 2025-06-02 DIAGNOSIS — M81.0 AGE-RELATED OSTEOPOROSIS WITHOUT CURRENT PATHOLOGICAL FRACTURE: ICD-10-CM

## 2025-06-02 DIAGNOSIS — C50.511 MALIGNANT NEOPLASM OF LOWER-OUTER QUADRANT OF RIGHT BREAST OF FEMALE, ESTROGEN RECEPTOR POSITIVE: Primary | ICD-10-CM

## 2025-06-02 DIAGNOSIS — Z17.0 MALIGNANT NEOPLASM OF LOWER-OUTER QUADRANT OF RIGHT BREAST OF FEMALE, ESTROGEN RECEPTOR POSITIVE: Primary | ICD-10-CM

## 2025-06-02 NOTE — TELEPHONE ENCOUNTER
----- Message from Woody Hi sent at 5/31/2025  4:49 PM CDT -----  2. Schedule baseline DEXA scan  3. Send biopsy right breast mass for Oncotype DX recurrence score

## 2025-06-04 ENCOUNTER — OFFICE VISIT (OUTPATIENT)
Dept: ONCOLOGY | Facility: CLINIC | Age: 75
End: 2025-06-04
Payer: MEDICARE

## 2025-06-04 VITALS
TEMPERATURE: 98.3 F | BODY MASS INDEX: 30.92 KG/M2 | SYSTOLIC BLOOD PRESSURE: 122 MMHG | DIASTOLIC BLOOD PRESSURE: 74 MMHG | WEIGHT: 168 LBS | HEART RATE: 76 BPM | OXYGEN SATURATION: 99 %

## 2025-06-04 DIAGNOSIS — C50.911 DUCTAL CARCINOMA OF RIGHT BREAST: Primary | ICD-10-CM

## 2025-06-04 LAB
CYTO UR: NORMAL
LAB AP CASE REPORT: NORMAL
LAB AP SPECIAL STAINS: NORMAL
LAB AP SYNOPTIC CHECKLIST: NORMAL
Lab: NORMAL
PATH REPORT.FINAL DX SPEC: NORMAL
PATH REPORT.GROSS SPEC: NORMAL

## 2025-06-05 ENCOUNTER — RESULTS FOLLOW-UP (OUTPATIENT)
Dept: PERIOP | Facility: HOSPITAL | Age: 75
End: 2025-06-05
Payer: MEDICARE

## 2025-06-09 ENCOUNTER — OFFICE VISIT (OUTPATIENT)
Dept: SURGERY | Facility: CLINIC | Age: 75
End: 2025-06-09
Payer: MEDICARE

## 2025-06-09 VITALS
WEIGHT: 168 LBS | DIASTOLIC BLOOD PRESSURE: 70 MMHG | SYSTOLIC BLOOD PRESSURE: 110 MMHG | HEART RATE: 88 BPM | HEIGHT: 62 IN | BODY MASS INDEX: 30.91 KG/M2 | OXYGEN SATURATION: 97 %

## 2025-06-09 DIAGNOSIS — C50.511 MALIGNANT NEOPLASM OF LOWER-OUTER QUADRANT OF RIGHT BREAST OF FEMALE, ESTROGEN RECEPTOR POSITIVE: Primary | ICD-10-CM

## 2025-06-09 DIAGNOSIS — E66.9 OBESITY (BMI 30-39.9): ICD-10-CM

## 2025-06-09 DIAGNOSIS — Z98.890 S/P LUMPECTOMY, RIGHT BREAST: ICD-10-CM

## 2025-06-09 DIAGNOSIS — Z85.3 PERSONAL HISTORY OF BREAST CANCER: ICD-10-CM

## 2025-06-09 DIAGNOSIS — Z17.0 MALIGNANT NEOPLASM OF LOWER-OUTER QUADRANT OF RIGHT BREAST OF FEMALE, ESTROGEN RECEPTOR POSITIVE: Primary | ICD-10-CM

## 2025-06-09 PROCEDURE — 99024 POSTOP FOLLOW-UP VISIT: CPT | Performed by: STUDENT IN AN ORGANIZED HEALTH CARE EDUCATION/TRAINING PROGRAM

## 2025-06-09 PROCEDURE — 1159F MED LIST DOCD IN RCRD: CPT | Performed by: STUDENT IN AN ORGANIZED HEALTH CARE EDUCATION/TRAINING PROGRAM

## 2025-06-09 PROCEDURE — 1160F RVW MEDS BY RX/DR IN RCRD: CPT | Performed by: STUDENT IN AN ORGANIZED HEALTH CARE EDUCATION/TRAINING PROGRAM

## 2025-06-09 NOTE — PROGRESS NOTES
"Patient: Warren Quinn    YOB: 1950    Date: 06/09/2025    Primary Care Provider: Claudia Keane DO    Vital Signs:   Vitals:    06/09/25 0843   BP: 110/70   Pulse: 88   SpO2: 97%   Weight: 76.2 kg (168 lb)   Height: 157 cm (61.81\")     She is s/p right lumpectomy with sentinel lymph node biopsy. Pathology reviewed. No fevers or chills. No pain. She is still on arimidex. Incision healing well.     Results Review:   I reviewed the patient's new clinical results.  Tissue Pathology Exam (05/29/2025 08:14)   Progress Notes by Woody Hi MD (06/04/2025 13:15)     Assessment / Plan:    Diagnoses and all orders for this visit:    1. Malignant neoplasm of lower-outer quadrant of right breast of female, estrogen receptor positive (Primary)    2. Personal history of breast cancer    3. Obesity (BMI 30-39.9)    4. S/P lumpectomy, right breast      She is doing well. She is a candidate to omit radiation based on Tumor < 2 cm, age > 70 and hormone positive on anti-endocrine therapy. Will have her see radiation oncology for risk vs. Benefit discussion. Follow up in 3 months.     Electronically signed by Madeline Sharma MD  06/09/25  08:57 CDT                  "

## 2025-06-09 NOTE — PATIENT INSTRUCTIONS

## 2025-06-10 ENCOUNTER — CLINICAL SUPPORT (OUTPATIENT)
Dept: SURGERY | Facility: CLINIC | Age: 75
End: 2025-06-10
Payer: MEDICARE

## 2025-06-10 ENCOUNTER — OFFICE VISIT (OUTPATIENT)
Dept: SURGERY | Facility: CLINIC | Age: 75
End: 2025-06-10
Payer: MEDICARE

## 2025-06-10 ENCOUNTER — TELEPHONE (OUTPATIENT)
Dept: SURGERY | Facility: CLINIC | Age: 75
End: 2025-06-10
Payer: MEDICARE

## 2025-06-10 DIAGNOSIS — N64.89 HEMATOMA (NONTRAUMATIC) OF BREAST: ICD-10-CM

## 2025-06-10 DIAGNOSIS — Z98.890 S/P LUMPECTOMY, RIGHT BREAST: Primary | ICD-10-CM

## 2025-06-10 RX ORDER — CEPHALEXIN 500 MG/1
500 CAPSULE ORAL 2 TIMES DAILY
Qty: 14 CAPSULE | Refills: 0 | Status: SHIPPED | OUTPATIENT
Start: 2025-06-10 | End: 2025-06-17

## 2025-06-10 NOTE — TELEPHONE ENCOUNTER
Patient called saying she woke up with dried blood under her right breast and and some fresh blood, she was unable to identify which incision it was coming from or in the incision was . I asked patient if she could come in office so we could take a look and see what is going on. She is coming in today for a nurse visit to be evaluated.

## 2025-06-10 NOTE — PROGRESS NOTES
Pt here today with complaints of bleeding from right breast incision. S/P Right lumpectomy on 5/29/25. Moderate amount of dark red colored drainage noted with bruising around incision and firmness. Uploaded picture in chart. Cleansed area with normal saline. 4x4 gauze and tegaderm applied with ace wrap.  Messaged Dr. Sharma and Isha XIONG. Isha XIONG will be over shortly to assess.

## 2025-06-10 NOTE — PROGRESS NOTES
Patient: Warren Quinn    YOB: 1950    Date: 06/10/2025    Primary Care Provider: Claudia Keane DO    Vital Signs:   There were no vitals filed for this visit.    Ms. Quinn is a 74-year-old female who presents to the clinic 1.5 weeks status post right breast lumpectomy.  Patient was seen in office by Dr. Sharma yesterday and was noted to have a hematoma in the lumpectomy bed.  Patient called for an appointment this morning after she woke up and had what she describes as a significant amount of blood on her close, her sheets, and in her floor.  Therefore, patient was brought in to office to be evaluated.    Upon physical exam, it was noted that the patient's incision had opened slightly and the hematoma was evacuating through this site.  I was able to express a large amount of blood clots that were dark red to brown in color.  No bright red blood concerning for active bleeding.  I irrigated the wound with 20 mLs of sterile saline.  I placed a pressure dressing with Tegaderm and wrapped the patient in an Ace wrap after this.    Assessment / Plan:    Diagnoses and all orders for this visit:    1. S/P lumpectomy, right breast (Primary)  -     cephalexin (KEFLEX) 500 MG capsule; Take 1 capsule by mouth 2 (Two) Times a Day for 7 days.  Dispense: 14 capsule; Refill: 0    2. Hematoma (nontraumatic) of breast  -     cephalexin (KEFLEX) 500 MG capsule; Take 1 capsule by mouth 2 (Two) Times a Day for 7 days.  Dispense: 14 capsule; Refill: 0        Warren Quinn is a 74 y.o. female who presents to the clinic 1.5 weeks s/p right lumpectomy.  I discussed with the patient that we can sometimes see this postoperatively as the hematoma evacuated through the path of least resistance, which was through her new incision.  I discussed that while there did not appear to be any active bleeding, and that dried blood and clots may continue to evacuate through this area.  Due to this, I have placed the patient  on a 7-day course of antibiotics.  I have also advised keeping the areas clean and dry, washing with antibacterial soap.  I have also strongly advised compression over the area with a pressure dressing or with set Ace wrap.  Patient will follow-up in office on Monday for recheck.  I gave the patient return precautions including concerns for active bleeding with bright red blood as opposed to dark red or brown blood as well as other concerning symptoms such as fever, nausea, vomiting.  Patient voiced understanding and is agreeable to the plan.    Follow up:     Return in about 6 days (around 6/16/2025) for Recheck.        Electronically signed by Isha Perez PA-C  06/10/25  16:54 CDT

## 2025-06-11 ENCOUNTER — OFFICE VISIT (OUTPATIENT)
Dept: SURGERY | Facility: CLINIC | Age: 75
End: 2025-06-11
Payer: MEDICARE

## 2025-06-11 VITALS
WEIGHT: 168 LBS | HEIGHT: 62 IN | HEART RATE: 88 BPM | DIASTOLIC BLOOD PRESSURE: 88 MMHG | BODY MASS INDEX: 30.91 KG/M2 | OXYGEN SATURATION: 96 % | SYSTOLIC BLOOD PRESSURE: 179 MMHG

## 2025-06-11 DIAGNOSIS — N64.89 HEMATOMA (NONTRAUMATIC) OF BREAST: ICD-10-CM

## 2025-06-11 DIAGNOSIS — Z98.890 S/P LUMPECTOMY, RIGHT BREAST: Primary | ICD-10-CM

## 2025-06-11 PROBLEM — Z87.891 FORMER SMOKER: Status: ACTIVE | Noted: 2025-06-11

## 2025-06-11 NOTE — PROGRESS NOTES
[facility]  Radiation Oncology Clinic   Antoine Lei MD, FACR  Jamelzafar Joseph JORDAN  _______________________________________________  [facility]  Department of Radiation Oncology  [address] [place] [id number]  Office: [phone]  Fax: [phone]    DATE: 06/16/2025  PATIENT: Warren Quinn  1950                         MEDICAL RECORD #: [id number]    1. Malignant neoplasm of lower-outer quadrant of right breast of female, estrogen receptor positive    2. S/P lumpectomy, right breast    3. S/P lymph node biopsy    4. Former smoker                                 REASON FOR VISIT:    Chief Complaint   Patient presents with    Breast Cancer     Warren Quinn is a very pleasant 74 y.o. female that has been referred to our clinic to discuss radiotherapy considerations for breast carcinoma.     This patient self discovered a palpable mass in the right breast.  Diagnostic mammogram was performed on April 1, 2025 which did reveal a nodule in the lower outer quadrant of the right breast.    Follow-up biopsy confirmed invasive adenocarcinoma consistent with mammary carcinoma of no special type.    Her tumor is strongly ER and NH positive and initial equivocal HER2/beltran.    FISH examination revealed HER2 negative status.    MRI of the breast confirm the right breast mass but no internal mammary or axillary adenopathy identified.    She had definitive surgery by Dr. Sharma on May 29, 2025.  She has tolerated this procedure well.    This patient does have a previous history of left breast carcinoma.  She is familiar with the concept of postoperative adjuvant radiotherapy as well as antiendocrine therapy.    Presentation today this patient has no specific complaints.    History of Present Illness:  04/01/2025 - Bilateral Diagnostic Mammogram due to palpable right breast lump:  Findings:   Fiduciary marker corresponds to a partially circumscribed  approximately 1.1 cm nodule in the lower outer quadrant of the right breast. It was not present previously. It is best seen in the CC projection.  Targeted right breast ultrasound demonstrates a mixed echogenicity irregular shaped 1.1 x 0.9 cm mass at 7:00 in the right breast.  Mammographic appearance of left breast is benign. Left breast implant present.  Recommend:   Percutaneous ultrasound-guided biopsy of the mix echogenicity mass right breast.    04/09/2025 - Right breast, mass at 7:00 (image-guided core biopsies):  Invasive adenocarcinoma, consistent with mammary carcinoma, no special type (ductal).  Intermediate combined histologic grade (G2).  Carcinoma involves 3 of 3 cores (at least 9 mm).  Comment: Given the uniform cytologic appearance of tumor cells and rounded tumor nests present in the specimen, I ordered E-cadherin and p63 immunostains to confirm the ductal and invasive natures of the tumor.  I interpreted stain results in tumor cells as consistent with the diagnoses above.  Control tissue stained appropriately.  Results of ancillary studies will be reported separately.    04/30/2025 - Consult with ion:  Assessment:  Mammary carcinoma, no special type (ductal)  Original tumor stage: AJCC-at least IA ([id number]) ER 90%+, MO 91%+, HER2 2+ equivocal/low-FISH p.  --Original tumor burden:  -3/21/2025- Mammogram-right breast asymmetry at 7 AM in the right breast measuring about 1 cm in size.  Right CC focal compression, right MLO focal compression and right true lateral 2D and 3D sequences recommended.  Ultrasound if persistent.  Stable left mammogram.  -4/9/2025- Right breast, mass at 7:00 (image-guided core biopsies):  -Invasive adenocarcinoma, consistent with mammary carcinoma, no special type (ductal).  -Intermediate combined histologic grade (G2).  -Carcinoma involves 3 of 3 cores (at least 9 mm). ER 90%+, MO 91%+, HER2 2+ equivocal/low-FISH negative  History of left breast cancer.  No  details  IBS  Osteopenia  Hypothyroidism  Plan:  Reviewed limited available diagnostic information as outlined above.  Baseline CBC with differential, CMP, CEA, CA 27-29  Schedule MRI breasts bilaterally  Schedule staging CT chest, abdomen/pelvis with IV contrast and baseline DEXA scan  Send biopsy right breast mass for Oncotype DX recurrence score  Refer to Dr. Sharma Re: Surgical assessment  Discussed the rationale and potential toxicities of aromatase inhibitors (increased risk for fractures, bone/joint pain, etc.) discussed at length.  Questions answered.  She agrees to a trial of therapy.  Rx:   Arimidex 1 mg p.o. daily dispense 30 x 11 RF  Os-Augustin 600/400 p.o. twice daily dispense 60 x 11 RF  Return to office in 4-5 weeks for further recommendations    05/05/2025 - MRI Bilateral Breasts with and without contrast:  RIGHT breast 1.2 cm mass, consistent with biopsy-proven malignancy.  No additional suspicious findings in the RIGHT or LEFT breast. Posttreatment changes to the LEFT breast and axilla.  No internal mammary or axillary adenopathy.  Gallstone and hiatal hernia.  BI-RADS CATEGORY 6: Known biopsy-proven malignancy  Management Recommendation: Surgical excision when clinically appropriate.    05/05/2025 - Appointment with :  Right breast cancer.  The right breast mass is classified as stage I, measuring between 1 and 1.5 cm based on ultrasound and biopsy results. It is estrogen-positive (90%), progesterone-positive (91%), and HER2-negative, indicating a less aggressive receptor status. The lymph nodes appear normal on ultrasound. She is currently on anastrozole. The Oncotype test has been ordered, and the results will guide the decision regarding chemotherapy. If the Oncotype results indicate high risk, chemotherapy will be recommended; otherwise, it will not be necessary. Surgical options discussed include breast conservation or lumpectomy with sentinel lymph node biopsy, with the possibility  of avoiding radiation if the final pathology shows the tumor is less than 2 cm and she can tolerate the hormone-blocking pill. The alternative option is a mastectomy, either unilateral or bilateral, with or without reconstruction. The patient prefers a lumpectomy. The procedure will be scheduled for 05/29/2025. A metallic seed will be placed prior to surgery to guide the surgeon. Preoperative workup, including a chest x-ray, EKG, and blood work, will be conducted on the same day as the seed placement. An MRI will be performed today to gather more information. If the MRI results align with expectations, the surgery will proceed as planned. If there are any changes based on the MRI, she will be contacted.  History of left breast cancer.  The patient had a left breast cancer diagnosis in 1998, treated with a lumpectomy, axillary dissection, adjuvant chemotherapy, and radiation. No current issues were reported related to the previous cancer.    05/06/2025 - CT Chest with contrast:  No CT evidence for metastatic disease to the chest.  Small right middle lobe pulmonary nodules are likely benign but continued attention on follow-up is recommended.    05/06/2025 - CT Abdomen/Pelvis with contrast:  No acute findings in the abdomen or pelvis to suggest neoplastic process or metastatic disease.  Cholelithiasis. No finding to suggest cholecystitis.  Bilateral small renal cysts.  Diverticulosis of the colon. No evidence of diverticulitis.    05/29/2025 - Right breast lumpectomy and lymph node biopsy per :  Right breast, lumpectomy:  Invasive carcinoma of no special type (ductal), grade 1.  Tumor is 12 mm.  Associated low-grade ductal carcinoma in situ exhibiting cribriform architecture and microcalcifications.  Biopsy site changes including biopsy cavity and fat necrosis.  Fibrocystic changes, usual ductal hyperplasia and columnar cell changes without atypia.  The inked margins of surgical excision are free of  tumor.  Comment: Invasive tumor is seen at 2.4 mm from the closest inked (lateral) margin of surgical excision and ductal carcinoma in situ is seen at 3.6 mm from the closest inked (lateral) margin of surgical excision.  Right breast, extended posterior margin:  Negative for malignancy.  Fibrocystic changes.  Medial calcifications identified in blood vessels.  Right breast, extended medial margin:  Negative for malignancy.  Fibrocystic changes.  Right breast, extended inferior margin:   Negative for malignancy.  Right axillary sentinel lymph node:  Lymph node (1), negative for metastatic carcinoma.  Prominent fatty infiltration.  AJCC stage: pT1c snpN0    Synoptic Checklist   INVASIVE CARCINOMA OF THE BREAST: Resection   8th Edition - Protocol posted: 6/19/2024INVASIVE CARCINOMA OF THE BREAST: RESECTION - All Specimens  SPECIMEN   Procedure  Not specified   Specimen Laterality  Right   TUMOR   Tumor Site  Not specified   Histologic Type  Invasive carcinoma of no special type (ductal)   Histologic Grade (Grant Histologic Score)     Glandular (Acinar) / Tubular Differentiation  Score 2   Nuclear Pleomorphism  Score 1   Mitotic Rate  Score 1   Overall Grade  Grade 1 (scores of 3, 4 or 5)   Tumor Size  Greatest dimension of largest invasive focus (Millimeters): 12 mm   Tumor Focality  Single focus of invasive carcinoma   Ductal Carcinoma In Situ (DCIS)  Present     Negative for extensive intraductal component (EIC)   Architectural Patterns  Cribriform   Nuclear Grade  Grade I (low)   Necrosis  Not identified   Lymphatic and / or Vascular Invasion  Not identified   Dermal Lymphatic and / or Vascular Invasion  No skin present   Microcalcifications  Present in DCIS     Present in non-neoplastic tissue   Treatment Effect in the Breast  No known presurgical therapy   MARGINS   Margin Status for Invasive Carcinoma  All margins negative for invasive carcinoma   Distance from Invasive Carcinoma to Closest Margin  2.4 mm    Closest Margin(s) to Invasive Carcinoma  Lateral   Margin Status for DCIS  All margins negative for DCIS   Distance from DCIS to Closest Margin  3.6 mm   Closest Margin(s) to DCIS  Lateral   REGIONAL LYMPH NODES   Regional Lymph Node Status  All regional lymph nodes negative for tumor   Total Number of Lymph Nodes Examined (sentinel and non-sentinel)  1   Number of [place] Nodes Examined  1   pTNM CLASSIFICATION (AJCC 8th Edition)   Reporting of pT, pN, and (when applicable) pM categories is based on information available to the pathologist at the time the report is issued. As per the AJCC (Chapter 1, 8th Ed.) it is the managing physician's responsibility to establish the final pathologic stage based upon all pertinent information, including but potentially not limited to this pathology report.   pT Category  pT1c   pN Category  pN0   N Suffix  (sn)   SPECIAL STUDIES        Estrogen Receptor (ER) Status  Positive (greater than 10% of cells demonstrate nuclear positivity)   Percentage of Cells with Nuclear Positivity  90 %        Progesterone Receptor (PgR) Status  Positive   Percentage of Cells with Nuclear Positivity  91 %        HER2 (by immunohistochemistry)  Equivocal (Score 2+)   Percentage of Cells with Uniform Intense Complete Membrane Staining  0 %        HER2 (by in situ hybridization)  Negative (not amplified)        Ki-67 Percentage of Positive Nuclei  7 %   Testing Performed on Case Number  [id number]     06/04/2025 - Appointment with Dr. Hi:  Plan:  Review available diagnostic information as outlined above.  -4/30/25- CMP normal. CEA 2.23. CA27-29 18.3. CBC normal  -5/5/25- MRI breast bilateral-1. RIGHT breast 1.2 cm mass, consistent with biopsy-proven malignancy.2. No additional suspicious findings in the RIGHT or LEFT breast.  -5/6/25- CT CAP-No CT evidence for metastatic disease to the chest, abdomen or pelvis to suggest neoplastic process or metastatic disease.  -5/22/25- CBC and CMP  normal  DEXA scan-need report from [facility] done last 5/2025  Send biopsy right breast mass for Oncotype DX recurrence score  Await path from surgery 5/29/25  Discussed the rationale and potential toxicities of aromatase inhibitors (increased risk for fractures, bone/joint pain, etc.) discussed at length.  Questions answered.  She is tolerating well and agrees to press on with therapy.  Rx:   Arimidex 1 mg p.o. daily dispense 30 x 11 RF  Os-Augustin 600/400 p.o. twice daily dispense 60 x 11 RF  Refer to radiation oncology  Return to office in 6 weeks with CMP, CBC/diff    06/09/2025 - Appointment with :  She is doing well. She is a candidate to omit radiation based on Tumor < 2 cm, age > 70 and hormone positive on anti-endocrine therapy. Will have her see radiation oncology for risk vs. Benefit discussion. Follow up in 3 months.     06/10/2025 - Appointment with Isha Perez PA-C:  Warren Quinn is a 74 y.o. female who presents to the clinic 1.5 weeks s/p right lumpectomy.  I discussed with the patient that we can sometimes see this postoperatively as the hematoma evacuated through the path of least resistance, which was through her new incision.  I discussed that while there did not appear to be any active bleeding, and that dried blood and clots may continue to evacuate through this area.  Due to this, I have placed the patient on a 7-day course of antibiotics.  I have also advised keeping the areas clean and dry, washing with antibacterial soap.  I have also strongly advised compression over the area with a pressure dressing or with set Ace wrap.  Patient will follow-up in office on Monday for recheck.  I gave the patient return precautions including concerns for active bleeding with bright red blood as opposed to dark red or brown blood as well as other concerning symptoms such as fever, nausea, vomiting.  Patient voiced understanding and is agreeable to the plan.  Follow up:   Return in about 6  days (around 6/16/2025) for Recheck.    06/11/2025 - Appointment with Isha Perez PA-C:  Warren Quinn is a 74 y.o. female who presents to the clinic 2 weeks s/p right lumpectomy. Hematoma continues to evacuate. I have recommended continued dressing changes and compression as well as to take the antibiotic as prescribed. I instructed the patient to call for an appointment if she has any new problems or concerns. She voiced understanding and is agreeable to the plan.  Follow up:   Return for Next scheduled follow up.    06/16/2025 - Appointment with Isha Perez PA-C:  Warren Quinn is a 74 y.o. female who presents to the clinic 3 weeks s/p right lumpectomy. She is overall doing well at this time. The hematoma is still evacuating through the incision but is clinically improving with less bruising present as well as less swelling.    At this time, she will follow up in office in 2 weeks with me for recheck. She will be due for mammogram at 6 months post op. I have instructed the patient to continue to dress the area and keep it clean and dry until it is no longer draining. I instructed the patient to call for an appointment if she has any new problems or concerns.   She voiced understanding and is agreeable to the plan.  Follow up:   Return in 2 weeks (on 6/30/2025) for Recheck.    07/16/2025 - Scheduled appointment with .    09/15/2025 - Scheduled appointment with .     History obtained from  PATIENT and CHART    PAST MEDICAL HISTORY  Past Medical History:   Diagnosis Date    Arthritis     Breast cancer     Disease of thyroid gland     GERD (gastroesophageal reflux disease)     Hyperlipidemia     Kidney stone     Osteopenia       PAST SURGICAL HISTORY  Past Surgical History:   Procedure Laterality Date    ANKLE SURGERY      BREAST AUGMENTATION      BREAST LUMPECTOMY      BREAST LUMPECTOMY WITH SENTINEL NODE BIOPSY Right 5/29/2025    Procedure: RIGHT BREAST LUMPECTOMY WITH MAG  SEED GUIDANCE AND MAGTRACE GUIDED SENTINEL LYMPH NODE BIOPSY;  Surgeon: Madeline Sharma MD;  Location: [facility] OR;  Service: General;  Laterality: Right;    BREAST RECONSTRUCTION      CARPAL TUNNEL RELEASE      CATARACT EXTRACTION      COLONOSCOPY  12/05/2014    COLONOSCOPY N/A 01/20/2020    Procedure: COLONOSCOPY WITH ANESTHESIA;  Surgeon: Elmer Crawley MD;  Location: [facility] ENDOSCOPY;  Service: Gastroenterology    ENDOSCOPY N/A 05/01/2018    Procedure: ESOPHAGOGASTRODUODENOSCOPY WITH ANESTHESIA;  Surgeon: Elmer Crawley MD;  Location: [facility] ENDOSCOPY;  Service: Gastroenterology    HAND SURGERY      3rd finger partial amputation    KIDNEY STONE SURGERY      TONSILLECTOMY      TOTAL HIP ARTHROPLASTY Right     TUBAL ABDOMINAL LIGATION        FAMILY HISTORY  family history includes Colon polyps in her father; Heart disease in her brother and father; No Known Problems in her mother.    SOCIAL HISTORY  Social History     Tobacco Use    Smoking status: Former     Passive exposure: Past    Smokeless tobacco: Never   Vaping Use    Vaping status: Never Used   Substance Use Topics    Alcohol use: Not Currently    Drug use: No     ALLERGIES  Patient has no known allergies.     MEDICATIONS    Current Outpatient Medications:     acetaminophen (Tylenol) 325 MG tablet, Take 3 tablets by mouth Every 8 (Eight) Hours. Take every 8 hours for 3 days then take prn as needed., Disp: , Rfl:     anastrozole (ARIMIDEX) 1 MG tablet, Take 1 tablet by mouth Daily., Disp: 30 tablet, Rfl: 11    Calcium Carbonate-Vitamin D 600-10 MG-MCG per tablet, Take 1 tablet by mouth 2 (Two) Times a Day., Disp: 60 tablet, Rfl: 11    cholecalciferol (VITAMIN D3) 1000 units tablet, Take 1 tablet by mouth Daily., Disp: , Rfl:     ibuprofen (Motrin IB) 200 MG tablet, Take 3 tablets by mouth Every 8 (Eight) Hours. Take every 8 hours for three days then take as needed., Disp: , Rfl:     levothyroxine (SYNTHROID, LEVOTHROID) 100 MCG tablet, Take 1  "tablet by mouth Daily., Disp: , Rfl:     naproxen (NAPROSYN) 250 MG tablet, Take 1 tablet by mouth As Needed (rare)., Disp: , Rfl:     ondansetron (Zofran) 4 MG tablet, Take 1 tablet by mouth Every 8 (Eight) Hours As Needed for Nausea or Vomiting., Disp: 15 tablet, Rfl: 0    oxyCODONE (Roxicodone) 5 MG immediate release tablet, Take 1 tablet by mouth Every 8 (Eight) Hours As Needed for Severe Pain., Disp: 10 tablet, Rfl: 0    pantoprazole (PROTONIX) 40 MG EC tablet, Take 1 tablet by mouth 2 (Two) Times a Day., Disp: , Rfl:     simvastatin (ZOCOR) 40 MG tablet, Take 1 tablet by mouth Every Night., Disp: , Rfl:     Current Facility-Administered Medications:     lidocaine (XYLOCAINE) 1 % injection 10 mL, 10 mL, Subcutaneous, Once, Darvin Jose MD    lidocaine (XYLOCAINE) 1 % injection 10 mL, 10 mL, Subcutaneous, Once, Steven Jimenez MD    lidocaine 1% - EPINEPHrine [id number] (XYLOCAINE W/EPI) 1 %-1:[id number] injection 10 mL, 10 mL, Injection, Once, Darvin Jose MD    The following portions of the patient's history were reviewed and updated as appropriate: allergies, current medications, past family history, past medical history, past social history, past surgical history and problem list.    Current outpatient and discharge medications have been reconciled for the patient.  Reviewed by: Antoine Silveira III, MD    REVIEW OF SYSTEMS  Review of Systems   Constitutional: Negative.    HENT: Negative.     Eyes: Negative.    Respiratory: Negative.     Cardiovascular: Negative.    Gastrointestinal: Negative.    Endocrine: Negative.    Genitourinary: Negative.    Musculoskeletal: Negative.    Skin: Negative.    Allergic/Immunologic: Negative.    Neurological: Negative.    Hematological: Negative.    Psychiatric/Behavioral: Negative.       Implant: NO    PHYSICAL EXAM  VITAL SIGNS:   Vitals:    06/16/25 1307   BP: 141/65   Weight: 76.7 kg (169 lb)   Height: 157 cm (61.81\")   PainSc: 0-No pain     Physical Exam  Vitals " reviewed.   Constitutional:       Appearance: Normal appearance.   HENT:      Head: Normocephalic.      Nose: Nose normal.   Eyes:      Pupils: Pupils are equal, round, and reactive to light.   Cardiovascular:      Rate and Rhythm: Normal rate and regular rhythm.      Pulses: Normal pulses.      Heart sounds: Normal heart sounds.   Pulmonary:      Effort: Pulmonary effort is normal. No respiratory distress.      Breath sounds: Normal breath sounds. No wheezing.   Abdominal:      General: Bowel sounds are normal.      Palpations: There is no mass.   Musculoskeletal:         General: Normal range of motion.      Cervical back: Normal range of motion and neck supple. No tenderness.   Lymphadenopathy:      Cervical: No cervical adenopathy.   Skin:     General: Skin is warm and dry.      Capillary Refill: Capillary refill takes less than 2 seconds.   Neurological:      General: No focal deficit present.      Mental Status: She is alert and oriented to person, place, and time.      Motor: No weakness.   Psychiatric:         Mood and Affect: Mood normal.         Behavior: Behavior normal.         Performance Status: ECOG (1) Restricted in physically strenuous activity, ambulatory and able to do work of light nature    Clinical Quality Measures  -Pain Documented by Standardized Tool, FPS  Juan Carlosne Cheyenne reports a pain score of 0.  Given her pain assessment as noted, treatment options were discussed and the following options were decided upon as a follow-up plan to address the patient's pain: No pain, no plan given.    - Body Mass Index Screening and Follow-Up Plan Body mass index is 31.1 kg/m².    Tobacco Use: Screening and Cessation Intervention  Social History    Tobacco Use      Smoking status: Former        Passive exposure: Past      Smokeless tobacco: Never    Advanced Care Planning   Advance Care Planning  ACP discussion was held with the patient during this visit. Patient has an advance directive in EMR which is  still valid.      - PHQ-2 Depression Screening:  Little interest or pleasure in doing things? Not at all   Feeling down, depressed, or hopeless? Not at all   PHQ-2 Total Score 0     ASSESSMENT AND PLAN  1. Malignant neoplasm of lower-outer quadrant of right breast of female, estrogen receptor positive    2. S/P lumpectomy, right breast    3. S/P lymph node biopsy    4. Former smoker        RECOMMENDATIONS: Warren Quinn was diagnosed with stage I carcinoma of the right breast.  This is ER and FL positive with HER2 beltran negative status.    She did have some residual hematoma after initial surgery but this has resolved.    Today I have thoroughly discussed the NCCN guidelines with this patient.  There is a change in the treatment paradigm since her previous malignancy.    She has been started on Arimidex under the direction of Dr. Houston.    I have advised the patient that with her low risk disease and plans for continued antiendocrine therapy she is a candidate to omit radiation.    Therefore, in consideration of risk versus benefit I have not recommended postoperative adjuvant radiotherapy to the right breast at this time.    However, if for some reason she is unable to tolerate antiendocrine therapy we would need to see her back and consider adjuvant radiation to the breast.    She verbalizes understanding of this discussion, voices no questions, and agrees to proceed with antiendocrine therapy and omitting breast radiation.    I did advise her there is some risk of local recurrence with or without radiation, but in my opinion the small benefit from radiation is not worth the potential side effects.    She will continue ongoing management per primary care physician and other specialists. Thank you for allowing me to assist in hercare.     We will see her back anytime on an as-needed basis.  She will continue to follow with Dr. Houston and Dr. Sharma    Time Spent: I spent 60 minutes caring for Warren  on this date of service. This time includes time spent by me in the following activities: preparing for the visit, reviewing tests, obtaining and/or reviewing a separately obtained history, performing a medically appropriate examination and/or evaluation, counseling and educating the patient/family/caregiver, ordering medications, tests, or procedures, and documenting information in the medical record.   Antoine Silveira III, MD  06/16/2025

## 2025-06-11 NOTE — PROGRESS NOTES
"Patient: Warren Quinn    YOB: 1950    Date: 06/11/2025    Primary Care Provider: Claudia Keane DO    Vital Signs:   Vitals:    06/11/25 0950   BP: 179/88   BP Location: Right leg   Patient Position: Sitting   Cuff Size: Adult   Pulse: 88   SpO2: 96%   Weight: 76.2 kg (168 lb)   Height: 157 cm (61.81\")       The patient is tolerating a regular diet and has no complaints s/p right lumpectomy on 5/29/25 by Dr. Sharma. The patient denies fevers, chills, nausea, vomiting, and excessive pain. She was seen in office both Monday and yesterday. Yesterday, she was noted to have a draining hematoma. She called for an appointment complaining of weakness, continued drainage, and dizziness. She states that she removed the wrap because she was bleeding.     Upon physical exam, the patient was noted to have continued hematoma evacuation through her lumpectomy incision. I was able to express multiple large dark red, almost brown clots. No concerns for active bleeding.     Results Review:   I reviewed the patient's new clinical results.        Assessment / Plan:    Diagnoses and all orders for this visit:    1. S/P lumpectomy, right breast (Primary)    2. Hematoma (nontraumatic) of breast        Warren Quinn is a 74 y.o. female who presents to the clinic 2 weeks s/p right lumpectomy. Hematoma continues to evacuate. I have recommended continued dressing changes and compression as well as to take the antibiotic as prescribed. I instructed the patient to call for an appointment if she has any new problems or concerns. She voiced understanding and is agreeable to the plan.    Follow up:     Return for Next scheduled follow up.        Electronically signed by Isha Perez PA-C  06/11/25  13:41 CDT                   "

## 2025-06-13 ENCOUNTER — HOSPITAL ENCOUNTER (OUTPATIENT)
Dept: RADIATION ONCOLOGY | Facility: HOSPITAL | Age: 75
Setting detail: RADIATION/ONCOLOGY SERIES
End: 2025-06-13
Payer: MEDICARE

## 2025-06-13 ENCOUNTER — HOSPITAL ENCOUNTER (OUTPATIENT)
Dept: BONE DENSITY | Facility: HOSPITAL | Age: 75
Discharge: HOME | End: 2025-06-13
Payer: MEDICARE

## 2025-06-13 ENCOUNTER — TELEPHONE (OUTPATIENT)
Dept: ONCOLOGY | Facility: CLINIC | Age: 75
End: 2025-06-13
Payer: MEDICARE

## 2025-06-13 DIAGNOSIS — M81.0 AGE-RELATED OSTEOPOROSIS WITHOUT CURRENT PATHOLOGICAL FRACTURE: ICD-10-CM

## 2025-06-13 DIAGNOSIS — C50.511 MALIGNANT NEOPLASM OF LOWER-OUTER QUADRANT OF RIGHT BREAST OF FEMALE, ESTROGEN RECEPTOR POSITIVE: ICD-10-CM

## 2025-06-13 DIAGNOSIS — Z17.0 MALIGNANT NEOPLASM OF LOWER-OUTER QUADRANT OF RIGHT BREAST OF FEMALE, ESTROGEN RECEPTOR POSITIVE: ICD-10-CM

## 2025-06-13 NOTE — TELEPHONE ENCOUNTER
Caller: Warren Quinn    Relationship: Self    Best call back number: [phone]    What is the best time to reach you: ANYTIME     Who are you requesting to speak with (clinical staff, provider,  specific staff member): CLINICAL    What was the call regarding: PT CALLING TO INFORM THAT HER INSURANCE WILL NOT COVER THE BONE SCAN ORDERED TO [facility] SINCE SHE JUST HAD A RECENT ONE ON 4-1 WITH [facility].

## 2025-06-13 NOTE — TELEPHONE ENCOUNTER
NOTIFIED PT THAT WE WILL CANCEL DEXA SCAN ORDERS AND GET RESULTS FROM 4/1/2025 FROM [facility].

## 2025-06-16 ENCOUNTER — CONSULT (OUTPATIENT)
Age: 75
End: 2025-06-16
Payer: MEDICARE

## 2025-06-16 ENCOUNTER — OFFICE VISIT (OUTPATIENT)
Dept: SURGERY | Facility: CLINIC | Age: 75
End: 2025-06-16
Payer: MEDICARE

## 2025-06-16 VITALS
WEIGHT: 169 LBS | SYSTOLIC BLOOD PRESSURE: 141 MMHG | DIASTOLIC BLOOD PRESSURE: 65 MMHG | HEIGHT: 62 IN | BODY MASS INDEX: 31.1 KG/M2

## 2025-06-16 VITALS
HEART RATE: 83 BPM | HEIGHT: 62 IN | WEIGHT: 168 LBS | OXYGEN SATURATION: 95 % | DIASTOLIC BLOOD PRESSURE: 100 MMHG | BODY MASS INDEX: 30.91 KG/M2 | SYSTOLIC BLOOD PRESSURE: 137 MMHG

## 2025-06-16 DIAGNOSIS — Z87.891 FORMER SMOKER: ICD-10-CM

## 2025-06-16 DIAGNOSIS — Z98.890 S/P LYMPH NODE BIOPSY: ICD-10-CM

## 2025-06-16 DIAGNOSIS — Z98.890 S/P LUMPECTOMY, RIGHT BREAST: Primary | ICD-10-CM

## 2025-06-16 DIAGNOSIS — Z98.890 S/P LUMPECTOMY, RIGHT BREAST: ICD-10-CM

## 2025-06-16 DIAGNOSIS — Z17.0 MALIGNANT NEOPLASM OF LOWER-OUTER QUADRANT OF RIGHT BREAST OF FEMALE, ESTROGEN RECEPTOR POSITIVE: Primary | ICD-10-CM

## 2025-06-16 DIAGNOSIS — N64.89 HEMATOMA (NONTRAUMATIC) OF BREAST: ICD-10-CM

## 2025-06-16 DIAGNOSIS — C50.511 MALIGNANT NEOPLASM OF LOWER-OUTER QUADRANT OF RIGHT BREAST OF FEMALE, ESTROGEN RECEPTOR POSITIVE: Primary | ICD-10-CM

## 2025-06-16 LAB
CYTO UR: NORMAL
LAB AP CASE REPORT: NORMAL
LAB AP SPECIAL STAINS: NORMAL
LAB AP SYNOPTIC CHECKLIST: NORMAL
Lab: NORMAL
PATH REPORT.ADDENDUM SPEC: NORMAL
PATH REPORT.FINAL DX SPEC: NORMAL
PATH REPORT.GROSS SPEC: NORMAL

## 2025-06-16 PROCEDURE — 1160F RVW MEDS BY RX/DR IN RCRD: CPT

## 2025-06-16 PROCEDURE — 99024 POSTOP FOLLOW-UP VISIT: CPT

## 2025-06-16 PROCEDURE — 1159F MED LIST DOCD IN RCRD: CPT

## 2025-06-16 PROCEDURE — G0463 HOSPITAL OUTPT CLINIC VISIT: HCPCS | Performed by: RADIOLOGY

## 2025-06-16 NOTE — PROGRESS NOTES
"Patient: Warren Quinn    YOB: 1950    Date: 06/16/2025    Primary Care Provider: Claudia Keane DO    Vital Signs:   Vitals:    06/16/25 1058   BP: 137/100   BP Location: Right leg   Patient Position: Sitting   Cuff Size: Adult   Pulse: 83   SpO2: 95%   Weight: 76.2 kg (168 lb)   Height: 157 cm (61.81\")       The patient is tolerating a regular diet and has no complaints s/p right lumpectomy on 5/29/25 by Dr. Sharma. The patient denies fevers, chills, nausea, vomiting, and excessive pain. Area of hematoma evacuation is still open and has drainage present, but unable to express any clots with palpation. Saw oncology & plan for no chemo. Sees radiation today. She has a few days left of antibiotics.       Assessment / Plan:    Diagnoses and all orders for this visit:    1. S/P lumpectomy, right breast (Primary)    2. Hematoma (nontraumatic) of breast        Warren Quinn is a 74 y.o. female who presents to the clinic 3 weeks s/p right lumpectomy. She is overall doing well at this time. The hematoma is still evacuating through the incision but is clinically improving with less bruising present as well as less swelling.  At this time, she will follow up in office in 2 weeks with me for recheck. She will be due for mammogram at 6 months post op. I have instructed the patient to continue to dress the area and keep it clean and dry until it is no longer draining. I instructed the patient to call for an appointment if she has any new problems or concerns. She voiced understanding and is agreeable to the plan.    Follow up:     Return in 2 weeks (on 6/30/2025) for Recheck.        Electronically signed by Isha Perez PA-C  06/16/25  12:28 CDT                   "

## 2025-06-30 ENCOUNTER — OFFICE VISIT (OUTPATIENT)
Dept: SURGERY | Facility: CLINIC | Age: 75
End: 2025-06-30
Payer: MEDICARE

## 2025-06-30 VITALS
BODY MASS INDEX: 30.4 KG/M2 | DIASTOLIC BLOOD PRESSURE: 72 MMHG | HEIGHT: 62 IN | WEIGHT: 165.2 LBS | SYSTOLIC BLOOD PRESSURE: 108 MMHG | OXYGEN SATURATION: 99 % | HEART RATE: 114 BPM

## 2025-06-30 DIAGNOSIS — Z98.890 S/P LUMPECTOMY, RIGHT BREAST: Primary | ICD-10-CM

## 2025-06-30 PROCEDURE — 1159F MED LIST DOCD IN RCRD: CPT

## 2025-06-30 PROCEDURE — 1160F RVW MEDS BY RX/DR IN RCRD: CPT

## 2025-06-30 PROCEDURE — 99024 POSTOP FOLLOW-UP VISIT: CPT

## 2025-06-30 NOTE — PROGRESS NOTES
"Patient: Warren Quinn    YOB: 1950    Date: 06/30/2025    Primary Care Provider: Claudia Keane DO    Vital Signs:   Vitals:    06/30/25 1408   BP: 108/72   BP Location: Left arm   Patient Position: Sitting   Cuff Size: Adult   Pulse: 114   SpO2: 99%   Weight: 74.9 kg (165 lb 3.2 oz)   Height: 157 cm (61.81\")       The patient is tolerating a regular diet and has no complaints s/p right lumpectomy with right sentinel lymph node biopsy on 5/29/25 by Dr. Sharma. The patient denies fevers, chills, nausea, vomiting, and excessive pain. The incision is healing well without signs of infection or wound dehiscence. There is still some bloody drainage present, but it is improving and bruising has completely resolved.       Assessment / Plan:    Diagnoses and all orders for this visit:    1. S/P lumpectomy, right breast (Primary)        Warren Quinn is a 74 y.o. female who presents to the clinic 4 weeks s/p right lumpectomy with sentinel lymph node biopsy. She is overall doing well at this time. The incision is healing well. At this time, she will follow up in office in 1 month with me for recheck. She will be due for mammogram at 6 months post op. I instructed the patient to call for an appointment if she has any new problems or concerns. She voiced understanding and is agreeable to the plan.    Follow up:     Return in about 1 month (around 7/30/2025) for 2 month post op.        Electronically signed by Isha Perez PA-C  06/30/25  14:22 CDT                   "

## 2025-07-12 NOTE — PROGRESS NOTES
[facility] HEMATOLOGY & ONCOLOGY  [address]  [facility] [id number] [phone]    Patient Name: Warren Quinn  Encounter Date: 07/16/2025  YOB: 1950  Patient Number: [id number]    REASON FOR VISIT:  Warren Quinn is a 74 yoF who returns in follow-up of mammary carcinoma, no special type (ductal)-- Original tumor stage: AJCC- IA ([id number]) ER 90%+, CT 91%+, HER2 2+ equivocal/low-FISH negative. Low risk Oncotype (RS 14).  Underwent right lumpectomy with right SLNB, 5/29/25. She has been on adjuvant anastrozole since 4/30/25 (2.5 mo).  She is here alone (previously with her daughter Joi).    I have reviewed the HPI and verified with the patient the accuracy of it. No changes to interval history since the information was documented. Woody Hi MD 07/16/25      Diagnostic abnormalities:  -  History of hypothyroidism, GERD, hyperlipidemia, kidney stones, osteopenia, arthritis, chronic postprandial diarrhea/IBS, history of left breast cancer    -3/21/2025- Mammogram-right breast asymmetry at 7 AM in the right breast measuring about 1 cm in size.  Right CC focal compression, right MLO focal compression and right true lateral 2D and 3D sequences recommended.  Ultrasound if persistent.  Stable left mammogram.    -4/9/2025- Right breast, mass at 7:00 (image-guided core biopsies):       -Invasive adenocarcinoma, consistent with mammary carcinoma, no special type (ductal).       -Intermediate combined histologic grade (G2).       -Carcinoma involves 3 of 3 cores (at least 9 mm). ER 90%+, CT 91%+, HER2 2+ equivocal/low-FISH negative    -4/30/25- CMP normal. CEA 2.23. CA27-29 18.3. CBC normal  -5/5/25- MRI breast bilateral-1. RIGHT breast 1.2 cm mass, consistent with biopsy-proven malignancy.2. No additional suspicious findings in the RIGHT or LEFT breast.Posttreatment changes to the LEFT breast and axilla.3. No internal  mammary or axillary adenopathy.4. Gallstone and hiatal hernia. BI-RADS CATEGORY 6: Known biopsy-proven malignancy  Management Recommendation: Surgical excision when clinically appropriate.  -5/6/25- CT CAP-No CT evidence for metastatic disease to the chest. Small right middle lobe pulmonary nodules are likely benign but.continued attention on follow-up is recommended.No acute findings in the abdomen or pelvis to suggest neoplastic process or metastatic disease.    Previous interventions:  - 4/30/2025- Patient is seen for management considerations.  - Adjuvant anastrozole since 4/30/25.  - 5/29/2025- Right lumpectomy with right SLNB. Final diagnosis:   1.  Right breast, lumpectomy:  - Invasive carcinoma of no special type (ductal), grade 1.  - Tumor is 12 mm.  - Associated low-grade ductal carcinoma in situ exhibiting cribriform architecture and microcalcifications.  - Biopsy site changes including biopsy cavity and fat necrosis.  - Fibrocystic changes, usual ductal hyperplasia and columnar cell changes without atypia.  - The inked margins of surgical excision are free of tumor.   Comment: Invasive tumor is seen at 2.4 mm from the closest inked (lateral) margin of surgical excision and ductal carcinoma in situ is seen at 3.6 mm from the closest inked (lateral) margin of surgical excision.   2.  Right breast, extended posterior margin:  - Negative for malignancy.  - Fibrocystic changes.  - Medial calcifications identified in blood vessels.   3.  Right breast, extended medial margin:  - Negative for malignancy.  - Fibrocystic changes.   4.  Right breast, extended inferior margin: Negative for malignancy.   5.  Right axillary sentinel lymph node:  - Lymph node (1), negative for metastatic carcinoma.  - Prominent fatty infiltration.   AJCC stage: pT1c snpN0    Synoptic Checklist   INVASIVE CARCINOMA OF THE BREAST: Resection   8th Edition - Protocol posted: 6/19/2024INVASIVE CARCINOMA OF THE BREAST: RESECTION - All  Specimens  SPECIMEN   Procedure  Not specified   Specimen Laterality  Right   TUMOR   Tumor Site  Not specified   Histologic Type  Invasive carcinoma of no special type (ductal)   Histologic Grade (Grant Histologic Score)     Glandular (Acinar) / Tubular Differentiation  Score 2   Nuclear Pleomorphism  Score 1   Mitotic Rate  Score 1   Overall Grade  Grade 1 (scores of 3, 4 or 5)   Tumor Size  Greatest dimension of largest invasive focus (Millimeters): 12 mm   Tumor Focality  Single focus of invasive carcinoma   Ductal Carcinoma In Situ (DCIS)  Present     Negative for extensive intraductal component (EIC)   Architectural Patterns  Cribriform   Nuclear Grade  Grade I (low)   Necrosis  Not identified   Lymphatic and / or Vascular Invasion  Not identified   Dermal Lymphatic and / or Vascular Invasion  No skin present   Microcalcifications  Present in DCIS     Present in non-neoplastic tissue   Treatment Effect in the Breast  No known presurgical therapy   MARGINS   Margin Status for Invasive Carcinoma  All margins negative for invasive carcinoma   Distance from Invasive Carcinoma to Closest Margin  2.4 mm   Closest Margin(s) to Invasive Carcinoma  Lateral   Margin Status for DCIS  All margins negative for DCIS   Distance from DCIS to Closest Margin  3.6 mm   Closest Margin(s) to DCIS  Lateral   REGIONAL LYMPH NODES   Regional Lymph Node Status  All regional lymph nodes negative for tumor   Total Number of Lymph Nodes Examined (sentinel and non-sentinel)  1   Number of Timbo Nodes Examined  1   pTNM CLASSIFICATION (AJCC 8th Edition)   Reporting of pT, pN, and (when applicable) pM categories is based on information available to the pathologist at the time the report is issued. As per the AJCC (Chapter 1, 8th Ed.) it is the managing physician's responsibility to establish the final pathologic stage based upon all pertinent information, including but potentially not limited to this pathology report.   pT Category   pT1c   pN Category  pN0   N Suffix  (sn)   SPECIAL STUDIES        Estrogen Receptor (ER) Status  Positive (greater than 10% of cells demonstrate nuclear positivity)   Percentage of Cells with Nuclear Positivity  90 %        Progesterone Receptor (PgR) Status  Positive   Percentage of Cells with Nuclear Positivity  91 %        HER2 (by immunohistochemistry)  Equivocal (Score 2+)   Percentage of Cells with Uniform Intense Complete Membrane Staining  0 %        HER2 (by in situ hybridization)  Negative (not amplified)        Ki-67 Percentage of Positive Nuclei  7 %   Testing Performed on Case Number  [id number]   .          LABS    Lab Results - Last 18 Months   Lab Units 07/16/25  0954 05/22/25  0852 05/06/25  1330 04/30/25  0912 05/29/24  1343   GLUCOSE mg/dL 216* 110*  --  110*  --    SODIUM mmol/L 136 142  --  142  --    POTASSIUM mmol/L 4.1 4.1  --  4.5  --    CO2 mmol/L 22.0 25.0  --  27.0  --    CHLORIDE mmol/L 103 105  --  104  --    ANION GAP mmol/L 11.0 12.0  --  11.0  --    CREATININE mg/dL 0.82 0.84 1.10 0.89 0.90   BUN mg/dL 16.9 15  --  19  --    BUN / CREAT RATIO  20.6 17.9  --  21.3  --    CALCIUM mg/dL 9.4 9.5  --  9.7  --    ALK PHOS U/L 67 67  --  69  --    TOTAL PROTEIN g/dL 6.9 7.0  --  7.3  --    ALT (SGPT) U/L 16 17  --  16  --    AST (SGOT) U/L 25 21  --  20  --    BILIRUBIN mg/dL 0.4 0.4  --  0.3  --    ALBUMIN g/dL 4.1 4.1  --  4.3  --    GLOBULIN gm/dL 2.8 2.9  --  3.0  --          PAST MEDICAL HISTORY:  ALLERGIES:  No Known Allergies  CURRENT MEDICATIONS:  Outpatient Encounter Medications as of 7/16/2025   Medication Sig Dispense Refill    anastrozole (ARIMIDEX) 1 MG tablet Take 1 tablet by mouth Daily. 30 tablet 11    Calcium Carbonate-Vitamin D 600-10 MG-MCG per tablet Take 1 tablet by mouth 2 (Two) Times a Day. 60 tablet 11    cholecalciferol (VITAMIN D3) 1000 units tablet Take 1 tablet by mouth Daily.      levothyroxine (SYNTHROID, LEVOTHROID) 100 MCG tablet Take 1 tablet by mouth Daily.       naproxen (NAPROSYN) 250 MG tablet Take 1 tablet by mouth As Needed (rare).      pantoprazole (PROTONIX) 40 MG EC tablet Take 1 tablet by mouth 2 (Two) Times a Day.      simvastatin (ZOCOR) 40 MG tablet Take 1 tablet by mouth Every Night.      [DISCONTINUED] acetaminophen (Tylenol) 325 MG tablet Take 3 tablets by mouth Every 8 (Eight) Hours. Take every 8 hours for 3 days then take prn as needed.      [DISCONTINUED] ibuprofen (Motrin IB) 200 MG tablet Take 3 tablets by mouth Every 8 (Eight) Hours. Take every 8 hours for three days then take as needed.      [DISCONTINUED] ondansetron (Zofran) 4 MG tablet Take 1 tablet by mouth Every 8 (Eight) Hours As Needed for Nausea or Vomiting. 15 tablet 0    [DISCONTINUED] oxyCODONE (Roxicodone) 5 MG immediate release tablet Take 1 tablet by mouth Every 8 (Eight) Hours As Needed for Severe Pain. 10 tablet 0     Facility-Administered Encounter Medications as of 7/16/2025   Medication Dose Route Frequency Provider Last Rate Last Admin    lidocaine (XYLOCAINE) 1 % injection 10 mL  10 mL Subcutaneous Once Darvin Jose MD        lidocaine (XYLOCAINE) 1 % injection 10 mL  10 mL Subcutaneous Once Steven Jimenez MD        lidocaine 1% - EPINEPHrine 1:[id number] (XYLOCAINE W/EPI) 1 %-1:348703 injection 10 mL  10 mL Injection Once Darvin Jose MD         ADULT ILLNESSES:  Patient Active Problem List   Diagnosis Code    Gastroesophageal reflux disease K21.9    Family hx colonic polyps Z83.719    Hx of colonic polyp Z86.0100    Ductal carcinoma of right breast C50.911    Malignant neoplasm of lower-outer quadrant of right breast of female, estrogen receptor positive C50.511, Z17.0    Former smoker Z87.891    S/P lymph node biopsy Z98.890    S/P lumpectomy, right breast Z98.890     SURGERIES:  Past Surgical History:   Procedure Laterality Date    ANKLE SURGERY      BREAST AUGMENTATION      BREAST BIOPSY  1998 2025    BREAST CYST ASPIRATION  1998    BREAST LUMPECTOMY      BREAST  LUMPECTOMY WITH SENTINEL NODE BIOPSY Right 05/29/2025    Procedure: RIGHT BREAST LUMPECTOMY WITH MAG SEED GUIDANCE AND MAGTRACE GUIDED SENTINEL LYMPH NODE BIOPSY;  Surgeon: Madeline Sharma MD;  Location: [facility] OR;  Service: General;  Laterality: Right;    BREAST RECONSTRUCTION      CARPAL TUNNEL RELEASE      CATARACT EXTRACTION      COLONOSCOPY  12/05/2014    COLONOSCOPY N/A 01/20/2020    Procedure: COLONOSCOPY WITH ANESTHESIA;  Surgeon: Elmer Crawley MD;  Location: [facility] ENDOSCOPY;  Service: Gastroenterology    ENDOSCOPY N/A 05/01/2018    Procedure: ESOPHAGOGASTRODUODENOSCOPY WITH ANESTHESIA;  Surgeon: Elmer Crawley MD;  Location: [facility] ENDOSCOPY;  Service: Gastroenterology    FRACTURE SURGERY      HAND SURGERY      3rd finger partial amputation    KIDNEY STONE SURGERY      REDUCTION MAMMAPLASTY      TONSILLECTOMY      TOTAL HIP ARTHROPLASTY Right     TUBAL ABDOMINAL LIGATION      UPPER GASTROINTESTINAL ENDOSCOPY       HEALTH MAINTENANCE ITEMS:  Health Maintenance Due   Topic Date Due    DXA SCAN  Never done    DIABETIC FOOT EXAM  Never done    DIABETIC EYE EXAM  Never done    URINE MICROALBUMIN-CREATININE RATIO (uACR)  Never done    Pneumococcal Vaccine 50+ (1 of 2 - PCV) Never done    HEPATITIS C SCREENING  Never done    HEMOGLOBIN A1C  Never done    RSV Vaccine - Adults (1 - 1-dose 75+ series) Never done    COVID-19 Vaccine (5 - 2024-25 season) 09/01/2024    COLORECTAL CANCER SCREENING  01/20/2025       <no information>  Last Completed Colonoscopy    This patient has no relevant Health Maintenance data.       Immunization History   Administered Date(s) Administered    COVID-19 (MODERNA) 1st,2nd,3rd Dose Monovalent 01/19/2021, 02/22/2021    COVID-19 (MODERNA) Monovalent Original Booster 11/15/2021, 07/18/2022     Last Completed Mammogram            Completed or No Longer Recommended       MAMMOGRAM  Discontinued        Frequency changed to Never automatically (Topic No Longer Applies)     "2025  MAMMO Scan    10/30/2018  Outside Claim: CHG SCREENING DIGITAL BREAST TOMOSYNTHESIS BI    10/30/2018  Outside Claim: HC MAMMOGRAM SCREENING BILAT DIGITAL W CAD    2017  Outside Claim: CO TOMOSYNTHESIS MAMMOGRAPHY     Only the first 5 history entries have been loaded, but more history exists.                            FAMILY HISTORY:  Family History   Problem Relation Age of Onset    Hypertension Mother     Colon polyps Father     Heart disease Father         Fathet    Hypertension Father     Kidney disease Father     Heart disease Brother         Brother    Colon cancer Neg Hx      SOCIAL HISTORY:  Social History     Socioeconomic History    Marital status: Single   Tobacco Use    Smoking status: Former     Current packs/day: 0.50     Types: Cigarettes     Passive exposure: Past    Smokeless tobacco: Never   Vaping Use    Vaping status: Never Used   Substance and Sexual Activity    Alcohol use: Not Currently    Drug use: No    Sexual activity: Not Currently     Partners: Female     Birth control/protection: None       REVIEW OF SYSTEMS:  Review of Systems   Constitutional:  Negative for activity change and fatigue.        Manages her ADLs to include chores, errands and driving.  Is up and about, \"all the time.\"    Anastrozole tolerance:  Mild hot flashes.  \"Just once\".  No new arthralgias,  Taking daily   HENT: Negative.     Eyes: Negative.    Respiratory: Negative.     Cardiovascular: Negative.    Gastrointestinal:  Positive for diarrhea (Intermittent.  IBS).   Endocrine: Negative.         G2,     Menarche age 11    Menopause 1998   Genitourinary: Negative.    Musculoskeletal:  Positive for arthralgias (Chronic).   Skin: Negative.    Allergic/Immunologic: Negative.    Neurological:  Positive for tremors (Essential).   Hematological: Negative.    Psychiatric/Behavioral: Negative.       /76   Pulse 89   Temp 98.1 °F (36.7 °C) (Temporal)   Resp 18   Ht 157 cm (61.81\")   Wt 76.4 kg " "(168 lb 8 oz)   SpO2 98%   BMI 31.01 kg/m²  Body surface area is 1.77 meters squared.  Pain Score    07/16/25 1020   PainSc: 0-No pain         Physical Exam  Vitals and nursing note reviewed. Exam conducted with a chaperone present.   Constitutional:       Comments: Pleasant, cooperative, heavyset, modestly kept elderly female.  Ambulatory.  ECOG 0.    Has no weight changes (had lost 2 lb at her prior visit) since her last visit     HENT:      Head: Normocephalic and atraumatic.   Eyes:      General: No scleral icterus.     Extraocular Movements: Extraocular movements intact.      Pupils: Pupils are equal, round, and reactive to light.   Cardiovascular:      Rate and Rhythm: Normal rate.   Pulmonary:      Effort: Pulmonary effort is normal.   Chest:          Comments: Chaperoned exam: Cristy Hart MA    Right breast is notable for clean dressing underlying the lumpectomy and SLNB sites which are no longer surrounded by postop ecchymosis nor swelling.  Patient states, \"the hematoma is still draining though\"    Left breast is notable for previous lumpectomy incision (left upper quadrant) which is well-healed and radiation associated skin thickening/pigmentation.    No associated axillary/supraclavicular adenopathy bilaterally..  Abdominal:      Palpations: Abdomen is soft.   Musculoskeletal:         General: Normal range of motion.      Cervical back: Normal range of motion.   Lymphadenopathy:      Upper Body:      Right upper body: No supraclavicular or axillary adenopathy.      Left upper body: No supraclavicular or axillary adenopathy.   Skin:     General: Skin is warm.   Neurological:      General: No focal deficit present.      Mental Status: She is alert and oriented to person, place, and time.   Psychiatric:         Mood and Affect: Mood normal.         Behavior: Behavior normal.         Thought Content: Thought content normal.         Assessment:  Mammary carcinoma, no special type (ductal)  -- Original " tumor stage: AJCC-IA ([id number]) ER 90%+, MI 91%+, HER2 2+ equivocal/low-FISH negative.  Low risk Oncotype (RS 14).  --Original tumor burden:  -3/21/2025- Mammogram-right breast asymmetry at 7 AM in the right breast measuring about 1 cm in size.  Right CC focal compression, right MLO focal compression and right true lateral 2D and 3D sequences recommended.  Ultrasound if persistent.  Stable left mammogram.    -4/9/2025- Right breast, mass at 7:00 (image-guided core biopsies):       -Invasive adenocarcinoma, consistent with mammary carcinoma, no special type (ductal).       -Intermediate combined histologic grade (G2).       -Carcinoma involves 3 of 3 cores (at least 9 mm). ER 90%+, MI 91%+, HER2 2+ equivocal/low-FISH negative    -4/30/25- CMP normal. CEA 2.23. CA27-29 18.3. CBC normal  -5/5/25- MRI breast bilateral-1. RIGHT breast 1.2 cm mass, consistent with biopsy-proven malignancy.2. No additional suspicious findings in the RIGHT or LEFT breast.Posttreatment changes to the LEFT breast and axilla.3. No internal mammary or axillary adenopathy.4. Gallstone and hiatal hernia. BI-RADS CATEGORY 6: Known biopsy-proven malignancy  Management Recommendation: Surgical excision when clinically appropriate.  -5/6/25- CT CAP-No CT evidence for metastatic disease to the chest. Small right middle lobe pulmonary nodules are likely benign but.continued attention on follow-up is recommended.No acute findings in the abdomen or pelvis to suggest neoplastic process or metastatic disease.  -5/29/25- Oncotype DX- RS 14; TRAM 4%; ACTB<1%  --Tumor status:  -4/30/2025- Begin adjuvant anastrozole 1 mg po qd  -5/29/2025- Right lumpectomy with right SLNB- path above  -6/16/2025- Consult radiation oncology, Dr. Silveira- I have advised the patient that with her low risk disease and plans for continued antiendocrine therapy she is a candidate to omit radiation.Therefore, in consideration of risk versus benefit I have not recommended  postoperative adjuvant radiotherapy to the right breast at this time.However, if for some reason she is unable to tolerate antiendocrine therapy we would need to see her back and consider adjuvant radiation to the breast.I did advise her there is some risk of local recurrence with or without radiation, but in my opinion the small benefit from radiation is not worth the potential side effects.We will see her back anytime on an as-needed basis.  She will continue to follow with Dr. Houston and Dr. Sharma    2.  History of left breast cancer.  No details  3.  IBS  4.  Osteopenia  5.  Hypothyroidism  6.  Non-fasting hyperglycemia    Plan:  Again review available diagnostic information as outlined above.  -4/30/25- CMP normal. CEA 2.23. CA27-29 18.3. CBC normal  -5/5/25- MRI breast bilateral-1. RIGHT breast 1.2 cm mass, consistent with biopsy-proven malignancy.2. No additional suspicious findings in the RIGHT or LEFT breast.  - 5/6/25- CT CAP-No CT evidence for metastatic disease to the chest, abdomen or pelvis to suggest neoplastic process or metastatic disease.  - 5/22/25- CBC and CMP normal  - 5/29/25- Oncotype DX- RS 14; DRR 4%; ACTB<1%  - 5/29/25- Review path from lumpectomy/SLNB  - 7/16/2025-glucose 216 otherwise normal CMP.  Normal CBC.    2.   Redraw fasting glucose  3.   Review consult radiation oncology-Dr. Silveira, 6/16/25 (above).  RT not recommended. RTC as needed  4.   DEXA scan-need report from [facility] done last 5/2025-2nd request  5.   Discussed the rationale and potential toxicities of aromatase inhibitors (increased risk for fractures, bone/joint pain, etc.) discussed at length.  Questions answered.  She is tolerating well and agrees to press on with therapy.    6.   Rx:   Arimidex 1 mg p.o. daily dispense 30 x 11 RF                Os-Augustin 600/400 p.o. twice daily dispense 60 x 11 RF    7.   Return to office in 16 weeks with CMP, CBC/diff. CEA, CA 27-29        I spent ~45 minutes caring for  Warren on this date of service. This time includes time spent by me in the following activities: preparing for the visit, reviewing tests, performing a medically appropriate examination and/or evaluation, counseling and educating the patient/family/caregiver, ordering medications, tests, or procedures and documenting information in the medical record.

## 2025-07-16 ENCOUNTER — LAB (OUTPATIENT)
Dept: LAB | Facility: HOSPITAL | Age: 75
End: 2025-07-16
Payer: MEDICARE

## 2025-07-16 ENCOUNTER — OFFICE VISIT (OUTPATIENT)
Dept: ONCOLOGY | Facility: CLINIC | Age: 75
End: 2025-07-16
Payer: MEDICARE

## 2025-07-16 VITALS
DIASTOLIC BLOOD PRESSURE: 76 MMHG | HEART RATE: 89 BPM | RESPIRATION RATE: 18 BRPM | HEIGHT: 62 IN | BODY MASS INDEX: 31.01 KG/M2 | WEIGHT: 168.5 LBS | OXYGEN SATURATION: 98 % | SYSTOLIC BLOOD PRESSURE: 138 MMHG | TEMPERATURE: 98.1 F

## 2025-07-16 DIAGNOSIS — C50.911 DUCTAL CARCINOMA OF RIGHT BREAST: Primary | ICD-10-CM

## 2025-07-16 DIAGNOSIS — R73.09 OTHER ABNORMAL GLUCOSE: ICD-10-CM

## 2025-07-16 LAB
ALBUMIN SERPL-MCNC: 4.1 G/DL (ref 3.5–5.2)
ALBUMIN/GLOB SERPL: 1.5 G/DL
ALP SERPL-CCNC: 67 U/L (ref 39–117)
ALT SERPL W P-5'-P-CCNC: 16 U/L (ref 1–33)
ANION GAP SERPL CALCULATED.3IONS-SCNC: 11 MMOL/L (ref 5–15)
AST SERPL-CCNC: 25 U/L (ref 1–32)
BASOPHILS # BLD AUTO: 0.05 10*3/MM3 (ref 0–0.2)
BASOPHILS NFR BLD AUTO: 0.6 % (ref 0–1.5)
BILIRUB SERPL-MCNC: 0.4 MG/DL (ref 0–1.2)
BUN SERPL-MCNC: 16.9 MG/DL (ref 8–23)
BUN/CREAT SERPL: 20.6 (ref 7–25)
CALCIUM SPEC-SCNC: 9.4 MG/DL (ref 8.6–10.5)
CHLORIDE SERPL-SCNC: 103 MMOL/L (ref 98–107)
CO2 SERPL-SCNC: 22 MMOL/L (ref 22–29)
CREAT SERPL-MCNC: 0.82 MG/DL (ref 0.57–1)
DEPRECATED RDW RBC AUTO: 43.6 FL (ref 37–54)
EGFRCR SERPLBLD CKD-EPI 2021: 74.2 ML/MIN/1.73
EOSINOPHIL # BLD AUTO: 0.18 10*3/MM3 (ref 0–0.4)
EOSINOPHIL NFR BLD AUTO: 2.3 % (ref 0.3–6.2)
ERYTHROCYTE [DISTWIDTH] IN BLOOD BY AUTOMATED COUNT: 13 % (ref 12.3–15.4)
GLOBULIN UR ELPH-MCNC: 2.8 GM/DL
GLUCOSE SERPL-MCNC: 216 MG/DL (ref 65–99)
HCT VFR BLD AUTO: 38.5 % (ref 34–46.6)
HGB BLD-MCNC: 12.8 G/DL (ref 12–15.9)
HOLD SPECIMEN: NORMAL
IMM GRANULOCYTES # BLD AUTO: 0.01 10*3/MM3 (ref 0–0.05)
IMM GRANULOCYTES NFR BLD AUTO: 0.1 % (ref 0–0.5)
LYMPHOCYTES # BLD AUTO: 2.24 10*3/MM3 (ref 0.7–3.1)
LYMPHOCYTES NFR BLD AUTO: 28.1 % (ref 19.6–45.3)
MCH RBC QN AUTO: 30.4 PG (ref 26.6–33)
MCHC RBC AUTO-ENTMCNC: 33.2 G/DL (ref 31.5–35.7)
MCV RBC AUTO: 91.4 FL (ref 79–97)
MONOCYTES # BLD AUTO: 0.52 10*3/MM3 (ref 0.1–0.9)
MONOCYTES NFR BLD AUTO: 6.5 % (ref 5–12)
NEUTROPHILS NFR BLD AUTO: 4.96 10*3/MM3 (ref 1.7–7)
NEUTROPHILS NFR BLD AUTO: 62.4 % (ref 42.7–76)
NRBC BLD AUTO-RTO: 0 /100 WBC (ref 0–0.2)
PLATELET # BLD AUTO: 263 10*3/MM3 (ref 140–450)
PMV BLD AUTO: 10.2 FL (ref 6–12)
POTASSIUM SERPL-SCNC: 4.1 MMOL/L (ref 3.5–5.2)
PROT SERPL-MCNC: 6.9 G/DL (ref 6–8.5)
RBC # BLD AUTO: 4.21 10*6/MM3 (ref 3.77–5.28)
SODIUM SERPL-SCNC: 136 MMOL/L (ref 136–145)
WBC NRBC COR # BLD AUTO: 7.96 10*3/MM3 (ref 3.4–10.8)

## 2025-07-16 PROCEDURE — 36415 COLL VENOUS BLD VENIPUNCTURE: CPT

## 2025-07-16 PROCEDURE — 80053 COMPREHEN METABOLIC PANEL: CPT

## 2025-07-16 PROCEDURE — 85025 COMPLETE CBC W/AUTO DIFF WBC: CPT

## 2025-07-17 ENCOUNTER — RESULTS FOLLOW-UP (OUTPATIENT)
Dept: ONCOLOGY | Facility: CLINIC | Age: 75
End: 2025-07-17
Payer: MEDICARE

## 2025-07-17 NOTE — TELEPHONE ENCOUNTER
----- Message from Woody Hi sent at 7/17/2025  4:05 PM CDT -----  Fasting glucose 127-please route to her PCP thank u  ----- Message -----  From: Kamilla, Simon Incoming  Sent: 7/17/2025   4:04 PM CDT  To: Woody Hi MD

## 2025-07-30 ENCOUNTER — OFFICE VISIT (OUTPATIENT)
Dept: SURGERY | Facility: CLINIC | Age: 75
End: 2025-07-30
Payer: MEDICARE

## 2025-07-30 VITALS
SYSTOLIC BLOOD PRESSURE: 126 MMHG | BODY MASS INDEX: 29.95 KG/M2 | DIASTOLIC BLOOD PRESSURE: 80 MMHG | HEIGHT: 63 IN | WEIGHT: 169 LBS

## 2025-07-30 DIAGNOSIS — Z98.890 S/P LUMPECTOMY, RIGHT BREAST: Primary | ICD-10-CM

## 2025-07-30 NOTE — PROGRESS NOTES
"Patient: Warren Quinn    YOB: 1950    Date: 07/30/2025    Primary Care Provider: Claudia Keane DO    Vital Signs:   Vitals:    07/30/25 0934   BP: 126/80   BP Location: Right arm   Patient Position: Sitting   Cuff Size: Adult   Weight: 76.7 kg (169 lb)   Height: 160 cm (63\")       The patient is tolerating a regular diet and has no complaints s/p right lumpectomy with sentinel lymph node biopsy on 5/29/2025 by Dr. Sharma. The patient denies fevers, chills, nausea, vomiting, and excessive pain. The incision is healing.  Patient does still have small 1 cm area of opening in the medial aspect of the incision.  No tunneling apparent.  Patient states there is some drainage still coming from the area, but it is less than previous.    She has seen radiation oncology and will get to omit radiation. She has also seen Dr. Hi and is taking anastrozole and is having some side effects, but nothing unbearable.       Assessment / Plan:    Diagnoses and all orders for this visit:    1. S/P lumpectomy, right breast (Primary)        Warren Quinn is a 74 y.o. female who presents to the clinic 8 weeks s/p right lumpectomy with sentinel lymph node biopsy. She is overall doing well at this time. The incision is healing.  Patient's course was complicated due to large hematoma of the lumpectomy bed.  At this time, she will follow up in office in 6 weeks with Dr. Sharma for her 3-month postop appointment. She will be due for mammogram at 6 months post op. I instructed the patient to call for an appointment if she has any new problems or concerns. She voiced understanding and is agreeable to the plan.    Follow up:     Return for Next scheduled follow up.        Electronically signed by Isha Perez PA-C  07/30/25  16:26 CDT                   "

## (undated) DEVICE — PATIENT RETURN ELECTRODE, SINGLE-USE, CONTACT QUALITY MONITORING, ADULT, WITH 9FT CORD, FOR PATIENTS WEIGING OVER 33LBS. (15KG): Brand: MEGADYNE

## (undated) DEVICE — SNAR POLYP SENSATION MICRO OVL 13 240X40

## (undated) DEVICE — YANKAUER,BULB TIP WITH VENT: Brand: ARGYLE

## (undated) DEVICE — CVR BRD ARM 13X30

## (undated) DEVICE — MASK,OXYGEN,MED CONC,ADLT,7' TUB, UC: Brand: PENDING

## (undated) DEVICE — PAD,NON-ADHERENT,3X8,STERILE,LF,1/PK: Brand: MEDLINE

## (undated) DEVICE — ANTIBACTERIAL UNDYED BRAIDED (POLYGLACTIN 910), SYNTHETIC ABSORBABLE SUTURE: Brand: COATED VICRYL

## (undated) DEVICE — THE CHANNEL CLEANING BRUSH IS A NYLON FLEXI BRUSH ATTACHED TO A FLEXIBLE PLASTIC SHEATH DESIGNED TO SAFELY REMOVE DEBRIS FROM FLEXIBLE ENDOSCOPES.

## (undated) DEVICE — SENSR O2 OXIMAX FNGR A/ 18IN NONSTR

## (undated) DEVICE — CUFF,BP,DISP,1 TUBE,ADULT,HP: Brand: MEDLINE

## (undated) DEVICE — SHEET,DRAPE,53X77,STERILE: Brand: MEDLINE

## (undated) DEVICE — MICRO HVTSA, 0.5G AND HVTSA SOURCEMARK PRODUCT CODE M1206 AND M1206-01: Brand: EXOFIN MICRO HVTSA, 0.5G

## (undated) DEVICE — DRSNG SURESITE WNDW 4X4.5

## (undated) DEVICE — Device: Brand: DEFENDO AIR/WATER/SUCTION AND BIOPSY VALVE

## (undated) DEVICE — DISPOSABLE SPECIMEN RADIOGRAPHY SYSTEM WITH LOCALIZING GRID, 4.65" RADIOLUCENT GRID: Brand: ACCUGRID

## (undated) DEVICE — TBG SMPL FLTR LINE NASL 02/C02 A/ BX/100

## (undated) DEVICE — 3M™ IOBAN™ 2 ANTIMICROBIAL INCISE DRAPE 6650EZ: Brand: IOBAN™ 2

## (undated) DEVICE — GLOVE,SURG,SENSICARE,ALOE,LF,PF,6: Brand: MEDLINE

## (undated) DEVICE — SUT MNCRYL 4/0 PS2 27IN UD MCP426H

## (undated) DEVICE — SYR CONTRL LUERLOK 10CC

## (undated) DEVICE — CLEANER,CAUTERY TIP,2X2",STERILE: Brand: MEDLINE

## (undated) DEVICE — PROBE COVER KIT: Brand: MEDLINE INDUSTRIES, INC.

## (undated) DEVICE — THE SINGLE USE ETRAP – POLYP TRAP IS USED FOR SUCTION RETRIEVAL OF ENDOSCOPICALLY REMOVED POLYPS.: Brand: ETRAP

## (undated) DEVICE — GLV SURG SENSICARE W/ALOE PF LF 6.5 STRL

## (undated) DEVICE — ENDOGATOR AUXILIARY WATER JET CONNECTOR: Brand: ENDOGATOR

## (undated) DEVICE — SYR PRECISIONGLIDE LL 5CC 20X1 1/2IN

## (undated) DEVICE — 4-PORT MANIFOLD: Brand: NEPTUNE 2

## (undated) DEVICE — PAD MINOR UNIVERSAL: Brand: MEDLINE INDUSTRIES, INC.

## (undated) DEVICE — FRCP BX RADJAW4 NDL 2.8 240 STD OG

## (undated) DEVICE — STERILE (14X122CM) TELESCOPICALLY-FOLDED COVER: Brand: CIV-CLEAR™ TRANSDUCER COVER

## (undated) DEVICE — CONMED SCOPE SAVER BITE BLOCK, 20X27 MM: Brand: SCOPE SAVER

## (undated) DEVICE — NDL HYPO PRECISIONGLIDE REG 25G 1 1/2

## (undated) DEVICE — ELECTRD BLD EZ CLN MOD XLNG 2.75IN

## (undated) DEVICE — TRAP FLD MINIVAC MEGADYNE 100ML